# Patient Record
Sex: FEMALE | Race: WHITE | NOT HISPANIC OR LATINO | Employment: OTHER | URBAN - METROPOLITAN AREA
[De-identification: names, ages, dates, MRNs, and addresses within clinical notes are randomized per-mention and may not be internally consistent; named-entity substitution may affect disease eponyms.]

---

## 2017-01-18 ENCOUNTER — LAB VISIT (OUTPATIENT)
Dept: LAB | Facility: HOSPITAL | Age: 22
End: 2017-01-18
Attending: OBSTETRICS & GYNECOLOGY
Payer: MEDICAID

## 2017-01-18 ENCOUNTER — INITIAL PRENATAL (OUTPATIENT)
Dept: OBSTETRICS AND GYNECOLOGY | Facility: CLINIC | Age: 22
End: 2017-01-18
Payer: MEDICAID

## 2017-01-18 ENCOUNTER — TELEPHONE (OUTPATIENT)
Dept: OBSTETRICS AND GYNECOLOGY | Facility: CLINIC | Age: 22
End: 2017-01-18

## 2017-01-18 VITALS
DIASTOLIC BLOOD PRESSURE: 66 MMHG | WEIGHT: 97.25 LBS | SYSTOLIC BLOOD PRESSURE: 100 MMHG | HEIGHT: 63 IN | BODY MASS INDEX: 17.23 KG/M2

## 2017-01-18 DIAGNOSIS — N91.2 AMENORRHEA: Primary | ICD-10-CM

## 2017-01-18 DIAGNOSIS — N91.2 AMENORRHEA: ICD-10-CM

## 2017-01-18 LAB
ABO + RH BLD: NORMAL
BLD GP AB SCN CELLS X3 SERPL QL: NORMAL
HCG INTACT+B SERPL-ACNC: NORMAL MIU/ML

## 2017-01-18 PROCEDURE — 86901 BLOOD TYPING SEROLOGIC RH(D): CPT

## 2017-01-18 PROCEDURE — 36415 COLL VENOUS BLD VENIPUNCTURE: CPT

## 2017-01-18 PROCEDURE — 86900 BLOOD TYPING SEROLOGIC ABO: CPT

## 2017-01-18 PROCEDURE — 84702 CHORIONIC GONADOTROPIN TEST: CPT

## 2017-01-18 PROCEDURE — 99204 OFFICE O/P NEW MOD 45 MIN: CPT | Mod: S$PBB,,, | Performed by: OBSTETRICS & GYNECOLOGY

## 2017-01-18 PROCEDURE — 99999 PR PBB SHADOW E&M-EST. PATIENT-LVL II: CPT | Mod: PBBFAC,,, | Performed by: OBSTETRICS & GYNECOLOGY

## 2017-01-18 PROCEDURE — 99212 OFFICE O/P EST SF 10 MIN: CPT | Mod: PBBFAC,PO,25 | Performed by: OBSTETRICS & GYNECOLOGY

## 2017-01-18 NOTE — PROGRESS NOTES
"21 y.o.   OB History      Para Term  AB TAB SAB Ectopic Multiple Living    1                 Comlaining of:  Amenorrhea, pt has hx of irreg cycles, last one October, not unusaul to skip  Has symptoms, no spotting,   G1 pos home test x 2    PMH neg  PSH neg   allergies sulfa  Sh neg  Fh neg      ROS:  GENERAL: No fever, chills, fatigability or weight loss.  SKIN: No rashes, itching or changes in color or texture of skin.  HEAD: No headaches or recent head trauma.  EYES: Visual acuity fine. No photophobia, ocular pain or diplopia.  EARS: Denies ear pain, discharge or vertigo.  NOSE: No loss of smell, no epistaxis or postnasal drip.  MOUTH & THROAT: No hoarseness or change in voice. No excessive gum bleeding.  NODES: Denies swollen glands.  CHEST: Denies GOETZ, cyanosis, wheezing, cough and sputum production.  CARDIOVASCULAR: Denies chest pain, PND, orthopnea or reduced exercise tolerance.  ABDOMEN: Appetite fine. No weight loss. Denies diarrhea, abdominal pain, hematemesis or blood in stool.  URINARY: No flank pain, dysuria or hematuria.  PERIPHERAL VASCULAR: No claudication or cyanosis.  MUSCULOSKELETAL: No joint stiffness or swelling. Denies back pain.  NEUROLOGIC: No history of seizures, paralysis, alteration of gait or coordination      PE:   Visit Vitals    /66    Ht 5' 3" (1.6 m)    Wt 44.1 kg (97 lb 3.9 oz)    LMP  (LMP Unknown)    BMI 17.23 kg/m2    head/neck normal, thyroid normal  Breasts , fibroicystic feeling on both sides, more on L, started since felt pregnant  abds soft, nontender, no organomegaly  extr normal, no edema    ass pos home test x 2  Plan, bedside us done, too early, no iup seen  Plan, send for hcg level  counselled on levels and expectations to see gest sac  Will call with results to pt        "

## 2017-01-19 NOTE — TELEPHONE ENCOUNTER
----- Message from Lauren Dudley sent at 1/19/2017  2:09 PM CST -----  Contact: [phone]/SELF  Pt would like to speak with the nurse about test results.  Please advise

## 2017-01-19 NOTE — TELEPHONE ENCOUNTER
Informed patient of results. Patient voiced understanding. Notified patient to call office if there were any further questions.   Patient scheduled for ultrasound on 1-23-17

## 2017-01-23 ENCOUNTER — OFFICE VISIT (OUTPATIENT)
Dept: OBSTETRICS AND GYNECOLOGY | Facility: CLINIC | Age: 22
End: 2017-01-23
Payer: MEDICAID

## 2017-01-23 ENCOUNTER — TELEPHONE (OUTPATIENT)
Dept: OBSTETRICS AND GYNECOLOGY | Facility: CLINIC | Age: 22
End: 2017-01-23

## 2017-01-23 DIAGNOSIS — N91.2 AMENORRHEA: ICD-10-CM

## 2017-01-23 DIAGNOSIS — Z36.89 ENCOUNTER TO ESTABLISH GESTATIONAL AGE USING ULTRASOUND: Primary | ICD-10-CM

## 2017-01-23 PROCEDURE — 76817 TRANSVAGINAL US OBSTETRIC: CPT | Mod: 26,S$PBB,, | Performed by: OBSTETRICS & GYNECOLOGY

## 2017-01-23 PROCEDURE — 76817 TRANSVAGINAL US OBSTETRIC: CPT | Mod: PBBFAC,PO

## 2017-01-23 NOTE — TELEPHONE ENCOUNTER
----- Message from Michael A. Wiedemann, MD sent at 1/23/2017 12:26 PM CST -----  us Lissette today fine, due date sept 18,  See me 2 weeks, yay

## 2017-01-23 NOTE — TELEPHONE ENCOUNTER
Informed patient of results. Patient voiced understanding. Notified patient to call office if there were any further questions. Patient to call and schedule two week follow up.

## 2017-01-30 ENCOUNTER — TELEPHONE (OUTPATIENT)
Dept: OBSTETRICS AND GYNECOLOGY | Facility: CLINIC | Age: 22
End: 2017-01-30

## 2017-01-30 ENCOUNTER — OFFICE VISIT (OUTPATIENT)
Dept: OBSTETRICS AND GYNECOLOGY | Facility: CLINIC | Age: 22
End: 2017-01-30
Payer: MEDICAID

## 2017-01-30 DIAGNOSIS — N89.8 VAGINAL DISCHARGE DURING PREGNANCY, UNSPECIFIED TRIMESTER: Primary | ICD-10-CM

## 2017-01-30 DIAGNOSIS — O26.899 VAGINAL DISCHARGE DURING PREGNANCY, UNSPECIFIED TRIMESTER: Primary | ICD-10-CM

## 2017-01-30 DIAGNOSIS — O26.899 VAGINAL DISCHARGE DURING PREGNANCY, UNSPECIFIED TRIMESTER: ICD-10-CM

## 2017-01-30 DIAGNOSIS — N89.8 VAGINAL DISCHARGE DURING PREGNANCY, UNSPECIFIED TRIMESTER: ICD-10-CM

## 2017-01-30 PROCEDURE — 76801 OB US < 14 WKS SINGLE FETUS: CPT | Mod: PBBFAC,PO

## 2017-01-30 PROCEDURE — 76801 OB US < 14 WKS SINGLE FETUS: CPT | Mod: 26,S$PBB,, | Performed by: OBSTETRICS & GYNECOLOGY

## 2017-01-30 NOTE — TELEPHONE ENCOUNTER
Returned patients call.   Patient notified to keep appointment as scheduled. Verbalized understanding.

## 2017-01-30 NOTE — TELEPHONE ENCOUNTER
"Returned patients call.   Patient is calling reporting clear vaginal discharge that is "coming out of her." Patient is negative for vaginal bleeding, vaginal irritation, abdominal pain and cramps. Verbalized understanding. Per Dr. Wiedemann, patient was given same day ultrasound for evaluation. Patient verbalized understanding.   "

## 2017-01-30 NOTE — TELEPHONE ENCOUNTER
----- Message from Maral Fernández sent at 1/30/2017  8:38 AM CST -----  Contact: self/[phone]  Patient says that she is having like a clear watery discharge coming out that is scaring her.  Please advise

## 2017-01-30 NOTE — TELEPHONE ENCOUNTER
----- Message from Erica Patel sent at 1/30/2017 11:09 AM CST -----  Contact: [phone]  Patient is requesting to come in before 12  To see the dr. Today, she has a 2pm and an ultrasound at 4pm

## 2017-02-06 ENCOUNTER — ROUTINE PRENATAL (OUTPATIENT)
Dept: OBSTETRICS AND GYNECOLOGY | Facility: CLINIC | Age: 22
End: 2017-02-06
Payer: MEDICAID

## 2017-02-06 VITALS — WEIGHT: 98.31 LBS | BODY MASS INDEX: 17.42 KG/M2

## 2017-02-06 DIAGNOSIS — Z3A.08 8 WEEKS GESTATION OF PREGNANCY: Primary | ICD-10-CM

## 2017-02-06 PROCEDURE — 99211 OFF/OP EST MAY X REQ PHY/QHP: CPT | Mod: PBBFAC,PO | Performed by: OBSTETRICS & GYNECOLOGY

## 2017-02-06 PROCEDURE — 99999 PR PBB SHADOW E&M-EST. PATIENT-LVL I: CPT | Mod: PBBFAC,,, | Performed by: OBSTETRICS & GYNECOLOGY

## 2017-02-06 PROCEDURE — 99213 OFFICE O/P EST LOW 20 MIN: CPT | Mod: TH,S$PBB,, | Performed by: OBSTETRICS & GYNECOLOGY

## 2017-02-06 RX ORDER — ONDANSETRON 4 MG/1
4 TABLET, FILM COATED ORAL DAILY PRN
Qty: 30 TABLET | Refills: 1 | Status: SHIPPED | OUTPATIENT
Start: 2017-02-06 | End: 2017-04-13

## 2017-03-16 ENCOUNTER — ROUTINE PRENATAL (OUTPATIENT)
Dept: OBSTETRICS AND GYNECOLOGY | Facility: CLINIC | Age: 22
End: 2017-03-16
Payer: MEDICAID

## 2017-03-16 ENCOUNTER — TELEPHONE (OUTPATIENT)
Dept: OBSTETRICS AND GYNECOLOGY | Facility: CLINIC | Age: 22
End: 2017-03-16

## 2017-03-16 VITALS — SYSTOLIC BLOOD PRESSURE: 88 MMHG | DIASTOLIC BLOOD PRESSURE: 60 MMHG | BODY MASS INDEX: 17.3 KG/M2 | WEIGHT: 97.69 LBS

## 2017-03-16 DIAGNOSIS — O20.0 THREATENED ABORTION: Primary | ICD-10-CM

## 2017-03-16 PROCEDURE — 99999 PR PBB SHADOW E&M-EST. PATIENT-LVL I: CPT | Mod: PBBFAC,,, | Performed by: OBSTETRICS & GYNECOLOGY

## 2017-03-16 PROCEDURE — 99211 OFF/OP EST MAY X REQ PHY/QHP: CPT | Mod: PBBFAC,PO | Performed by: OBSTETRICS & GYNECOLOGY

## 2017-03-16 PROCEDURE — 99213 OFFICE O/P EST LOW 20 MIN: CPT | Mod: TH,S$PBB,, | Performed by: OBSTETRICS & GYNECOLOGY

## 2017-03-16 NOTE — TELEPHONE ENCOUNTER
Pt says zofran is not working at all. Also, she says she has started to spot. Pt says last night she started to bleed a little bit heavier than a spot. Pt requests to come in tot he office today. Pt will come at 1

## 2017-03-16 NOTE — PROGRESS NOTES
Pt came for pelvic pain and spotting, fht good, abd soft, cx ltc,no blood or fluid on glove  Us done, viable, good fluid,   Pt with hx of PID, so posss scare tissue pulling,  No bladder symtooms,

## 2017-03-16 NOTE — TELEPHONE ENCOUNTER
----- Message from Barbara Jurado sent at 3/16/2017 11:03 AM CDT -----  Contact: Self/[phone]  Patient is experiencing nausea and spotting, She would like to be seen today if possible and also schedule an ultrasound. Please advise

## 2017-03-18 ENCOUNTER — NURSE TRIAGE (OUTPATIENT)
Dept: ADMINISTRATIVE | Facility: CLINIC | Age: 22
End: 2017-03-18

## 2017-03-18 NOTE — TELEPHONE ENCOUNTER
Reason for Disposition   Back pain during pregnancy   [1] SEVERE back pain (e.g., excruciating, unable to do any normal activities) AND [2] not improved 2 hours after pain medicine    Protocols used: ST BACK PAIN-A-AH, ST PREGNANCY - BACK PAIN-A-    Pt calling in regards to severe back pain even with taking pain medication prescribed by OB MD.  Advised pt to take one 325mg tab of Tylenol along with Oxycodone for pain management.  If the pain is not relieved go to ED for further assistance.

## 2017-03-18 NOTE — TELEPHONE ENCOUNTER
Reason for Disposition   Message left on identified answering machine.    Protocols used: ST NO CONTACT OR DUPLICATE CONTACT CALL-A-  LM x 2 on  at 1151am

## 2017-03-20 ENCOUNTER — TELEPHONE (OUTPATIENT)
Dept: OBSTETRICS AND GYNECOLOGY | Facility: CLINIC | Age: 22
End: 2017-03-20

## 2017-03-20 ENCOUNTER — HOSPITAL ENCOUNTER (EMERGENCY)
Facility: HOSPITAL | Age: 22
Discharge: HOME | End: 2017-03-20
Attending: EMERGENCY MEDICINE
Payer: MEDICAID

## 2017-03-20 VITALS
SYSTOLIC BLOOD PRESSURE: 111 MMHG | OXYGEN SATURATION: 100 % | DIASTOLIC BLOOD PRESSURE: 71 MMHG | TEMPERATURE: 98 F | BODY MASS INDEX: 17.36 KG/M2 | WEIGHT: 98 LBS | RESPIRATION RATE: 18 BRPM | HEART RATE: 66 BPM | HEIGHT: 63 IN

## 2017-03-20 DIAGNOSIS — O26.892 VAGINAL DISCHARGE DURING PREGNANCY, SECOND TRIMESTER: ICD-10-CM

## 2017-03-20 DIAGNOSIS — N89.8 VAGINAL DISCHARGE DURING PREGNANCY, SECOND TRIMESTER: ICD-10-CM

## 2017-03-20 DIAGNOSIS — O26.899 PELVIC PAIN IN PREGNANCY: Primary | ICD-10-CM

## 2017-03-20 DIAGNOSIS — R10.2 PELVIC PAIN IN PREGNANCY: Primary | ICD-10-CM

## 2017-03-20 LAB
ALBUMIN SERPL BCP-MCNC: 3.7 G/DL
ALP SERPL-CCNC: 46 U/L
ALT SERPL W/O P-5'-P-CCNC: 8 U/L
ANION GAP SERPL CALC-SCNC: 8 MMOL/L
AST SERPL-CCNC: 12 U/L
B-HCG UR QL: POSITIVE
BACTERIA GENITAL QL WET PREP: ABNORMAL
BASOPHILS # BLD AUTO: 0.03 K/UL
BASOPHILS NFR BLD: 0.2 %
BILIRUB SERPL-MCNC: 0.3 MG/DL
BILIRUB UR QL STRIP: NEGATIVE
BUN SERPL-MCNC: 7 MG/DL
CALCIUM SERPL-MCNC: 9.8 MG/DL
CHLORIDE SERPL-SCNC: 107 MMOL/L
CLARITY UR: ABNORMAL
CLUE CELLS VAG QL WET PREP: ABNORMAL
CO2 SERPL-SCNC: 21 MMOL/L
COLOR UR: YELLOW
CREAT SERPL-MCNC: 0.6 MG/DL
CTP QC/QA: YES
CTP QC/QA: YES
DIFFERENTIAL METHOD: ABNORMAL
EOSINOPHIL # BLD AUTO: 0.1 K/UL
EOSINOPHIL NFR BLD: 1.1 %
ERYTHROCYTE [DISTWIDTH] IN BLOOD BY AUTOMATED COUNT: 13.3 %
EST. GFR  (AFRICAN AMERICAN): >60 ML/MIN/1.73 M^2
EST. GFR  (NON AFRICAN AMERICAN): >60 ML/MIN/1.73 M^2
FILAMENT FUNGI VAG WET PREP-#/AREA: ABNORMAL
FLUAV AG SPEC QL IA: NEGATIVE
FLUBV AG SPEC QL IA: NEGATIVE
GLUCOSE SERPL-MCNC: 83 MG/DL
GLUCOSE UR QL STRIP: NEGATIVE
HCT VFR BLD AUTO: 35.7 %
HGB BLD-MCNC: 12.3 G/DL
HGB UR QL STRIP: NEGATIVE
KETONES UR QL STRIP: NEGATIVE
LEUKOCYTE ESTERASE UR QL STRIP: NEGATIVE
LIPASE SERPL-CCNC: 18 U/L
LYMPHOCYTES # BLD AUTO: 2.2 K/UL
LYMPHOCYTES NFR BLD: 18.7 %
MCH RBC QN AUTO: 33.6 PG
MCHC RBC AUTO-ENTMCNC: 34.5 %
MCV RBC AUTO: 98 FL
MONOCYTES # BLD AUTO: 0.4 K/UL
MONOCYTES NFR BLD: 3.5 %
NEUTROPHILS # BLD AUTO: 9.2 K/UL
NEUTROPHILS NFR BLD: 76.2 %
NITRITE UR QL STRIP: NEGATIVE
PH UR STRIP: >8 [PH] (ref 5–8)
PLATELET # BLD AUTO: 203 K/UL
PMV BLD AUTO: 10.6 FL
POTASSIUM SERPL-SCNC: 3.8 MMOL/L
PROT SERPL-MCNC: 6.7 G/DL
PROT UR QL STRIP: NEGATIVE
RBC # BLD AUTO: 3.66 M/UL
SODIUM SERPL-SCNC: 136 MMOL/L
SP GR UR STRIP: 1.02 (ref 1–1.03)
SPECIMEN SOURCE: ABNORMAL
SPECIMEN SOURCE: NORMAL
T VAGINALIS GENITAL QL WET PREP: ABNORMAL
URN SPEC COLLECT METH UR: ABNORMAL
UROBILINOGEN UR STRIP-ACNC: NEGATIVE EU/DL
WBC # BLD AUTO: 12.01 K/UL
WBC #/AREA VAG WET PREP: ABNORMAL
YEAST GENITAL QL WET PREP: ABNORMAL

## 2017-03-20 PROCEDURE — 87591 N.GONORRHOEAE DNA AMP PROB: CPT

## 2017-03-20 PROCEDURE — 87400 INFLUENZA A/B EACH AG IA: CPT | Mod: 59

## 2017-03-20 PROCEDURE — 63600175 PHARM REV CODE 636 W HCPCS: Performed by: PHYSICIAN ASSISTANT

## 2017-03-20 PROCEDURE — 25000003 PHARM REV CODE 250: Performed by: PHYSICIAN ASSISTANT

## 2017-03-20 PROCEDURE — 96361 HYDRATE IV INFUSION ADD-ON: CPT

## 2017-03-20 PROCEDURE — 81003 URINALYSIS AUTO W/O SCOPE: CPT

## 2017-03-20 PROCEDURE — 80053 COMPREHEN METABOLIC PANEL: CPT

## 2017-03-20 PROCEDURE — 85025 COMPLETE CBC W/AUTO DIFF WBC: CPT

## 2017-03-20 PROCEDURE — 99285 EMERGENCY DEPT VISIT HI MDM: CPT | Mod: 25

## 2017-03-20 PROCEDURE — 83690 ASSAY OF LIPASE: CPT

## 2017-03-20 PROCEDURE — 87210 SMEAR WET MOUNT SALINE/INK: CPT

## 2017-03-20 PROCEDURE — 96375 TX/PRO/DX INJ NEW DRUG ADDON: CPT

## 2017-03-20 PROCEDURE — 96365 THER/PROPH/DIAG IV INF INIT: CPT

## 2017-03-20 PROCEDURE — 81025 URINE PREGNANCY TEST: CPT

## 2017-03-20 RX ORDER — ACETAMINOPHEN 500 MG
1000 TABLET ORAL
Status: COMPLETED | OUTPATIENT
Start: 2017-03-20 | End: 2017-03-20

## 2017-03-20 RX ORDER — ONDANSETRON 2 MG/ML
4 INJECTION INTRAMUSCULAR; INTRAVENOUS
Status: COMPLETED | OUTPATIENT
Start: 2017-03-20 | End: 2017-03-20

## 2017-03-20 RX ORDER — CEFTRIAXONE 250 MG/1
250 INJECTION, POWDER, FOR SOLUTION INTRAMUSCULAR; INTRAVENOUS
Status: DISCONTINUED | OUTPATIENT
Start: 2017-03-20 | End: 2017-03-20

## 2017-03-20 RX ORDER — AZITHROMYCIN 250 MG/1
250 TABLET, FILM COATED ORAL 2 TIMES DAILY
Qty: 14 TABLET | Refills: 0 | Status: SHIPPED | OUTPATIENT
Start: 2017-03-20 | End: 2017-03-27

## 2017-03-20 RX ORDER — OXYCODONE AND ACETAMINOPHEN TABLETS 10; 300 MG/1; MG/1
1 TABLET ORAL EVERY 4 HOURS PRN
COMMUNITY
End: 2017-04-13

## 2017-03-20 RX ORDER — SODIUM CHLORIDE 9 MG/ML
1000 INJECTION, SOLUTION INTRAVENOUS
Status: COMPLETED | OUTPATIENT
Start: 2017-03-20 | End: 2017-03-20

## 2017-03-20 RX ORDER — AZITHROMYCIN 250 MG/1
1000 TABLET, FILM COATED ORAL
Status: COMPLETED | OUTPATIENT
Start: 2017-03-20 | End: 2017-03-20

## 2017-03-20 RX ADMIN — ACETAMINOPHEN 1000 MG: 500 TABLET ORAL at 04:03

## 2017-03-20 RX ADMIN — ONDANSETRON 4 MG: 2 INJECTION INTRAMUSCULAR; INTRAVENOUS at 03:03

## 2017-03-20 RX ADMIN — SODIUM CHLORIDE 1000 ML: 0.9 INJECTION, SOLUTION INTRAVENOUS at 02:03

## 2017-03-20 RX ADMIN — AZITHROMYCIN 1000 MG: 250 TABLET, FILM COATED ORAL at 03:03

## 2017-03-20 RX ADMIN — CEFTRIAXONE 1 G: 1 INJECTION, SOLUTION INTRAVENOUS at 03:03

## 2017-03-20 NOTE — TELEPHONE ENCOUNTER
----- Message from Barbara Jurado sent at 3/20/2017  2:20 PM CDT -----  Contact: Self/[phone]  Patient said she has been in the ER for 2 hours and would like to speak with you. Please advise

## 2017-03-20 NOTE — ED AVS SNAPSHOT
[facility]  [address]  [place] [address]               Raya Kent   3/20/2017  1:20 PM   ED    Description:  Female : 1995   Department:  [facility]           Your Care was Coordinated By:     Provider Role From To    Janice Oliveira Do, MD Attending Provider 17 [phone] --    Shira Jordan PA-C Physician Assistant 17 [id number] --      Reason for Visit     Generalized Body Aches           Diagnoses this Visit        Comments    Pelvic pain in pregnancy    -  Primary       ED Disposition     None           To Do List           Follow-up Information     Follow up with Michael A Wiedemann, MD In 2 days.    Specialties:  Obstetrics, Obstetrics and Gynecology    Contact information:    [address] [place] [place] [address]  [phone]         These Medications        Disp Refills Start End    azithromycin (Z-BUCKY) 250 MG tablet 14 tablet 0 3/20/2017 3/27/2017    Take 1 tablet (250 mg total) by mouth 2 (two) times daily. - Oral    Pharmacy: [facility]/pharmacy #[id number] - [place] - [address] [facility] Ph #: [phone]         [facility]     [facility] Care [facility] -  Assistance  Registered nurses in the [facility] provide clinical advisement, health education, appointment booking, and other advisory services.  Call for this free service at [phone].             Medications           START taking these NEW medications        Refills    azithromycin (Z-BUCKY) 250 MG tablet 0    Sig: Take 1 tablet (250 mg total) by mouth 2 (two) times daily.    Class: Print    Route: Oral      These medications were administered today        Dose Freq    0.9%  NaCl infusion 1,000 mL ED 1 Time    Sig: Inject 1,000 mLs into the vein ED 1 Time.    Class: Normal    Route: Intravenous    Cosign for Ordering: Required by Janice Oliveira Do, MD    ondansetron injection 4 mg 4 mg ED 1 Time    Sig: Inject 4 mg into the vein ED 1  "Time.    Class: Normal    Route: Intravenous    Cosign for Ordering: Required by Janice Oliveira Do, MD    azithromycin tablet 1,000 mg 1,000 mg ED 1 Time    Sig: Take 4 tablets (1,000 mg total) by mouth ED 1 Time.    Class: Normal    Route: Oral    Cosign for Ordering: Required by Janice Oliveira Do, MD    cefTRIAXone (ROCEPHIN) 1 g in dextrose 5 % 50 mL IVPB 1 g ED 1 Time    Sig: Inject 50 mLs (1 g total) into the vein ED 1 Time.    Class: Normal    Route: Intravenous    Cosign for Ordering: Required by Janice Oliveira Do, MD    acetaminophen tablet 1,000 mg 1,000 mg ED 1 Time    Sig: Take 2 tablets (1,000 mg total) by mouth ED 1 Time.    Class: Normal    Route: Oral    Cosign for Ordering: Required by Janice Oliveira Do, MD           Verify that the below list of medications is an accurate representation of the medications you are currently taking.  If none reported, the list may be blank. If incorrect, please contact your healthcare provider. Carry this list with you in case of emergency.           Current Medications     oxycodone-acetaminophen (LYNOX)  mg per tablet Take 1 tablet by mouth every 4 (four) hours as needed for Pain.    azithromycin (Z-BUCKY) 250 MG tablet Take 1 tablet (250 mg total) by mouth 2 (two) times daily.    ondansetron (ZOFRAN, AS HYDROCHLORIDE,) 4 MG tablet Take 1 tablet (4 mg total) by mouth daily as needed for Nausea.           Clinical Reference Information           Your Vitals Were     BP Pulse Temp Resp Height Weight    111/71 (BP Location: Right arm, Patient Position: Standing, BP Method: Automatic) 66 98.3 °F (36.8 °C) (Oral) 18 5' 3" (1.6 m) 44.5 kg (98 lb)    Last Period SpO2 BMI          (LMP Unknown) 100% 17.36 kg/m2        Allergies as of 3/20/2017        Reactions    Sulfa (Sulfonamide Antibiotics) Hives      Immunizations Administered on Date of Encounter - 3/20/2017     None      ED Micro, Lab, POCT     Start Ordered       Status Ordering Provider    03/20/17 1414 " 03/20/17 1414  UPT (Pregnancy, urine rapid)  STAT      In process     03/20/17 1414 03/20/17 1414  C. trachomatis/N. gonorrhoeae by AMP DNA Urine  STAT      In process     03/20/17 1414 03/20/17 1414  Vaginal Screen Vagina  STAT      Final result     03/20/17 1414 03/20/17 1414  Influenza antigen Nasopharyngeal Swab  STAT      Final result     03/20/17 1414 03/20/17 1414  C. trachomatis/N. gonorrhoeae by AMP DNA  Once      In process     03/20/17 1413 03/20/17 1414  CBC W/ AUTO DIFFERENTIAL  Once      Final result     03/20/17 1413 03/20/17 1414  Comp. Metabolic Panel  STAT      Final result     03/20/17 1413 03/20/17 1414  Lipase  STAT      Final result     03/20/17 1413 03/20/17 1414  Urinalysis - Clean Catch  STAT      Final result     03/20/17 0000 03/20/17 1428  POCT urine pregnancy     Comments:  This order was created through External Result Entry    Completed     03/20/17 0000 03/20/17 1552  POCT urine pregnancy     Comments:  This order was created through External Result Entry    Completed       ED Imaging Orders     None        Discharge Instructions         Abdominal Pain and Early Pregnancy    (To rule out ectopic pregnancy or miscarriage)  Our tests show that you are pregnant, but the exact cause of your pain isnt clear.  Some pain and bleeding are common early in pregnancy. Often they stop, and you can go on to have a normal pregnancy and baby. Other times the pain or bleeding can be signs of a miscarriage or ectopic pregnancy. An ectopic pregnancy is a very serious problem. At this time it is unclear if your pregnancy will continue normally, if you will have a miscarriage, or if you could have an ectopic pregnancy. Below is some information about this.  Miscarriage  At this time we dont know whether you will have a miscarriage, or if things will clear up and your pregnancy will continue normally. We understand that this is emotionally difficult. There is little we can say to change the way you  feel. But understand that miscarriages are common.  About 1 or 2 out of every 10 pregnancies end this way. Some end even before you know you are pregnant. This happens for a number of reasons, and usually we never figure out why. Its important you know that it is not your fault. It didnt happen because you did anything wrong.  Having sex or exercising does not cause a miscarriage. These activities are usually safe unless you have pain or bleeding or your doctor tells you to stop. Even minor falls wont cause a miscarriage. Miscarriages happen because things were not developing as they were supposed to. No medicine can prevent a miscarriage.  Ectopic pregnancy  In a normal pregnancy, the fertilized egg attaches to the wall of the womb (uterus). In an ectopic or tubal pregnancy, the fertilized egg attaches outside the uterus, usually in the fallopian tube. Very rarely, the egg attaches to an ovary or somewhere else in the abdomen. An ectopic pregnancy is much less common than a miscarriage, but it is very serious. The baby cannot survive, and as it grows it can rupture the tube. This can cause internal bleeding and even death. Risk factors for an ectopic are:  · An ectopic pregnancy in the past  · Pelvic inflammatory disease, or PID  · Endometriosis  · Smoking  · An IUD  Additional tests  Because we dont know whats causing your symptoms, you will need more tests to figure out what the problem is. You may need:  Ultrasound  An ultrasound can usually find a normal pregnancy as early as 4 to 5 weeks along. If the ultrasound does not show the baby inside the uterus, it means that:  · You have a normal pregnancy less than 4 weeks along, or  · You are having or recently had a miscarriage, or  · You have an ectopic pregnancy  Quantitative HCG  This test measures the amount of a pregnancy hormone in your blood. Comparing today's test result to a repeat test in 2 days will show whether you have a normal  pregnancy.  Laparoscopy  This is a type of surgery. The healthcare provider will put a tube with a light inside your belly (abdomen) to look directly at your pelvic organs. This test is used when it is not safe to wait 2 days for blood test results.  Important information  If you do have an ectopic pregnancy, there is a small chance that the growing fetus can tear the fallopian tube. This can cause severe internal bleeding. If this happens, you may have:  · Sudden severe pain in your lower abdomen  · Vaginal bleeding  · Weakness, dizziness, and sometimes fainting  If any of these symptoms occur:  · Call 911 or return immediately to the hospital.  · Do not drive yourself.  · Do not go to your healthcare provider's office or to a clinic. Go to the hospital.   Home care  Follow these guidelines to help care for yourself at home:  · Rest until your next exam. Dont do anything strenuous.  · Eat a light diet with foods that are easy to digest.  · Dont have sex until your healthcare provider says its OK.  Follow-up care  Follow up with your healthcare provider, or as advised. If you were told to have a repeat blood test in 2 days, its important to get it done.  If you had an X-ray or ultrasound, a radiologist will review it. You will be told of any new findings that may affect your care.  Call 911  Call 911 if you have any of these:  · Severe pain and very heavy bleeding  · Severe lightheadedness, passing out, or fainting  · Rapid heart rate  · Trouble breathing  · Confused or difficulty waking up  When to seek medical advice  Call your healthcare provider right away if any of these occur:  · The pain in your abdomen gets worse, either suddenly or gradually.  · You are dizzy or weak when you stand.  · You have heavy vaginal bleeding. This means soaking 1 pad an hour for 3 hours.  · You have vaginal bleeding for more than 5 days.  · You have repeated vomiting or diarrhea.  · The pain in your abdomen moves to the lower  right.  · You have blood in your vomit or bowel movements. This will be dark red or black.  · You have a fever of 100.4ºF (38ºC) or higher, or as directed by your healthcare provider  Date Last Reviewed: 10/1/2016  © 5016-7737 [employer]. [address] [place] [address]. All rights reserved. This information is not intended as a substitute for professional medical care. Always follow your healthcare professional's instructions.          Your Scheduled Appointments     Mar 23, 2017 10:00 AM CDT   Routine Prenatal Visit with Michael A. Wiedemann, MD [facility] ([place])    [address] [place] [address]  5th Floor [facility], [address]  [facility] [address] [phone]              [facility] Sign-Up     Activating your [facility] account is as easy as 1-2-3!     1) Visit [email or web address], select Sign Up Now, enter this activation code and your date of birth, then select Next.  [email or web address]  Expires: 5/4/2017  4:50 PM      2) Create a username and password to use when you visit [facility] in the future and select a security question in case you lose your password and select Next.    3) Enter your e-mail address and click Sign Up!    Additional Information  If you have questions, please e-mail [email or web address] or call [phone] to talk to our [facility] staff. Remember, [facility] is NOT to be used for urgent needs. For medical emergencies, dial 911.         Smoking Cessation     If you would like to quit smoking:   You may be eligible for free services if you are a [place] resident and started smoking cigarettes before September 1, 1988.  Call the Smoking Cessation Trust (SCT) toll free at (989) 621-7223 or (761) 503-1792.   Call 8-800-QUIT-NOW if you do not meet the above criteria.             [facility] complies with applicable Federal civil rights laws and does not discriminate on the basis of race, color, national origin, age, disability, or sex.        Language  Assistance Services     ATTENTION: Language assistance services are available, free of charge. Please call [phone].      ATENCIÓN: Si habla español, tiene a patiño disposición servicios gratuitos de asistencia lingüística. Llame al [phone].     CHÚ Ý: N?u b?n nói Ti?ng Vi?t, có các d?ch v? h? tr? ngôn ng? mi?n phí dành cho b?n. G?i s? [phone].

## 2017-03-20 NOTE — TELEPHONE ENCOUNTER
----- Message from Anurag Michaud sent at 3/20/2017 10:39 AM CDT -----  Contact: self/[phone]  She has severe pain in back and pelvis; she 4 months and 5 days; she is requesting an appointment today.

## 2017-03-20 NOTE — TELEPHONE ENCOUNTER
"Returned patients call.   Patient was advised at her last visit to go to the ER for her pain and the patient states that she did not due to not wanting her "old man" to carry her due to her not being able to walk due to the pain. Patient states she has pain medication that was given to her on 03/16/17 that she has been taking for her pain but states the medication is now out and she is in severe pain. Patient was notified that since the pain has become unmanageable by medication it is advised she should go to the ER for further treatment and testing. Patient verbalized understanding.   "

## 2017-03-20 NOTE — ED NOTES
Pt c/o generalized body aches since she has 8 weeks pregnant.  Pt states she is prescribed percocet per OBGYN.  Pt states she is approx 15 weeks pregnant.  Pt denies vaginal bleeding.  Pt c/o body aches including abd.

## 2017-03-20 NOTE — ED PROVIDER NOTES
Encounter Date: 3/20/2017       History     Chief Complaint   Patient presents with    Generalized Body Aches     x3 months; states is 15 weeks pregnant; patient of Dr. Marin; states has been on percocet and has been having no relief; denies vaginal bleeding;      Review of patient's allergies indicates:   Allergen Reactions    Sulfa (sulfonamide antibiotics) Hives     HPI Comments: Raya Kent, a 21 y.o. Female  that presents to the ED for generalized body pain for the last 3 months.  She is 15 weeks pregnant and is being followed by Dr. Lester who has prescribed percocet.  This medication is not helping her generalized body aches.  In addition she has not had a good bowel movement in over a week and is suffering with nausea. She denies any vaginal bleeding, vaginal discharge.  She does have a PMH of being infected with chlamydia on three separate occasions and PID for which she received treatment.        The history is provided by the patient.     Past Medical History:   Diagnosis Date    Depression     H/O psychiatric hospitalization      Past Surgical History:   Procedure Laterality Date    TYMPANOPLASTY       Family History   Problem Relation Age of Onset    Hypertension Father     Diabetes Father     Hypertension Mother     Diabetes Mother      Social History   Substance Use Topics    Smoking status: Former Smoker     Types: Cigarettes    Smokeless tobacco: Never Used    Alcohol use None     Review of Systems   Constitutional: Negative for fever.   HENT: Negative for trouble swallowing.    Respiratory: Negative for cough and shortness of breath.    Gastrointestinal: Positive for abdominal pain, constipation and nausea. Negative for diarrhea and vomiting.   Genitourinary: Positive for pelvic pain. Negative for dysuria, vaginal bleeding and vaginal discharge.   Skin: Negative for color change and rash.   Neurological: Negative for speech difficulty and numbness.   Hematological: Does not  bruise/bleed easily.   Psychiatric/Behavioral: Negative for agitation and confusion.   All other systems reviewed and are negative.      Physical Exam   Initial Vitals   BP Pulse Resp Temp SpO2   03/20/17 1304 03/20/17 1304 03/20/17 1304 03/20/17 1304 03/20/17 1304   112/53 90 14 98.3 °F (36.8 °C) 100 %     Physical Exam    Nursing note and vitals reviewed.  Constitutional: She appears well-developed and well-nourished. No distress.   HENT:   Head: Normocephalic and atraumatic.   Right Ear: External ear normal.   Left Ear: External ear normal.   Nose: Nose normal.   Eyes: Conjunctivae and EOM are normal.   Neck: Normal range of motion. No tracheal deviation present.   Cardiovascular: Normal rate and regular rhythm.   Pulmonary/Chest: Breath sounds normal. No respiratory distress. She has no wheezes. She has no rhonchi. She has no rales.   Abdominal: Soft. Bowel sounds are normal. She exhibits no distension. There is tenderness in the right lower quadrant, suprapubic area and left lower quadrant. There is no rigidity, no rebound and no guarding.   Genitourinary: Vagina normal and uterus normal. Pelvic exam was performed with patient supine. Cervix exhibits motion tenderness and discharge (copious amounts of cloudy discharge ). No erythema or bleeding in the vagina.   Musculoskeletal: Normal range of motion.   Neurological: She is alert and oriented to person, place, and time. She has normal strength.   Skin: Skin is warm and dry.   Psychiatric: She has a normal mood and affect. Thought content normal.         ED Course   Procedures  Labs Reviewed   CBC W/ AUTO DIFFERENTIAL - Abnormal; Notable for the following:        Result Value    RBC 3.66 (*)     Hematocrit 35.7 (*)     MCH 33.6 (*)     Gran # 9.2 (*)     Gran% 76.2 (*)     Mono% 3.5 (*)     All other components within normal limits   COMPREHENSIVE METABOLIC PANEL - Abnormal; Notable for the following:     CO2 21 (*)     Alkaline Phosphatase 46 (*)     ALT 8 (*)      All other components within normal limits   URINALYSIS - Abnormal; Notable for the following:     Appearance, UA Hazy (*)     pH, UA >8.0 (*)     All other components within normal limits   VAGINAL SCREEN - Abnormal; Notable for the following:     WBC - Vaginal Screen Moderate (*)     Bacteria - Vaginal Screen Many (*)     All other components within normal limits   C. TRACHOMATIS/N. GONORRHOEAE BY AMP DNA   C. TRACHOMATIS/N. GONORRHOEAE BY AMP DNA   LIPASE   INFLUENZA A AND B ANTIGEN   PREGNANCY TEST, URINE RAPID             Medical Decision Making:   Initial Assessment:   Generalized body aches, abdominal pain in pregnancy   Differential Diagnosis:   STD, UTI, BV, Trich, PID  Clinical Tests:   Lab Tests: Ordered and Reviewed  The following lab test(s) were unremarkable: CBC, CMP, Urinalysis and Lipase  ED Management:  Fluids and zofran given in the ED.  Tylenol was given with little improvement of pain, but declining to give narcotics d/t pregnancy.  Consulted with Dr. Wiedemann who instructed to give the patient 1 week of azithromycin and to have her f/u in clinic this week for further evaluation.  No further testing requested by Dr. Juan to be done in the ED at this time. She was instructed to refrain from any sexual activity until she is cleared by her OB/GYN and to take tylenol as needed for pain.  Strict return precautions given and patient verbalized understanding.    RX: azithromycin                 Attending Attestation:     Physician Attestation Statement for NP/PA:   I discussed this assessment and plan of this patient with the NP/PA, but I did not personally examine the patient. The face to face encounter was performed by the NP/PA.    Other NP/PA Attestation Additions:    History of Present Illness: Pt with vaginal discharge and generalized pain all over since getting pregnant.  Vitals stable    Physical Exam: Thick vaginal discharge on exam, otherwise normal exam   Medical Decision Making:  Chlamydia and gonorrhea treated.  Dr. Juan consulted and asked that pt be started in azithromycin and he follow up cultures.                  ED Course     Clinical Impression:   The encounter diagnosis was Pelvic pain in pregnancy.          Shira Jordan PA-C  03/20/17 0983       Janice Oliveira Do, MD  03/20/17 2035

## 2017-03-20 NOTE — TELEPHONE ENCOUNTER
Pt states that she is downstairs in the ED and she would like Dr. Wiedemann to come see her. Pt advised that Dr. Wiedemann is on call and if they ED MD feels he needs to be consulted he will see her. Pt verbalized understanding. Pt scheduled for ob appt 3/23

## 2017-03-21 LAB
C TRACH DNA SPEC QL NAA+PROBE: NOT DETECTED
N GONORRHOEA DNA SPEC QL NAA+PROBE: NOT DETECTED

## 2017-03-23 ENCOUNTER — ROUTINE PRENATAL (OUTPATIENT)
Dept: OBSTETRICS AND GYNECOLOGY | Facility: CLINIC | Age: 22
End: 2017-03-23
Payer: MEDICAID

## 2017-03-23 VITALS — SYSTOLIC BLOOD PRESSURE: 98 MMHG | DIASTOLIC BLOOD PRESSURE: 70 MMHG | BODY MASS INDEX: 17.11 KG/M2 | WEIGHT: 96.56 LBS

## 2017-03-23 DIAGNOSIS — Z3A.14 14 WEEKS GESTATION OF PREGNANCY: Primary | ICD-10-CM

## 2017-03-23 PROCEDURE — 99213 OFFICE O/P EST LOW 20 MIN: CPT | Mod: TH,S$PBB,, | Performed by: OBSTETRICS & GYNECOLOGY

## 2017-03-23 PROCEDURE — 99212 OFFICE O/P EST SF 10 MIN: CPT | Mod: PBBFAC,PO | Performed by: OBSTETRICS & GYNECOLOGY

## 2017-03-23 PROCEDURE — 99999 PR PBB SHADOW E&M-EST. PATIENT-LVL II: CPT | Mod: PBBFAC,,, | Performed by: OBSTETRICS & GYNECOLOGY

## 2017-03-23 NOTE — PROGRESS NOTES
fht good, abd soft, looks and feels better, was in ed, std negative, still feel it ws from scr tissue, pt asking for narcotic painmeds, not given, told to use tyleneol on limited basis,

## 2017-03-23 NOTE — LETTER
March 23, 2017    Raya Kent  [address]  [place] [address]              [place] [facility]  [address], [address]  [address] [facility]  [place] [address]  Phone: [phone]    To Whom It May Concern:    Ms. Kent is currently under our care for pregnancy. She is able to work with no restrictions at this time. If you have any further questions please feel free to contact our office.   Estimated Date of Delivery: 09-        Sincerely,          Michael A. Wiedemann, M.D.

## 2017-04-10 ENCOUNTER — TELEPHONE (OUTPATIENT)
Dept: OBSTETRICS AND GYNECOLOGY | Facility: CLINIC | Age: 22
End: 2017-04-10

## 2017-04-10 NOTE — LETTER
April 10, 2017    Raya Kent  [address]  [place] [address]              [place] [facility]  [address]  [address]th [address] [facility]  [place] [address]  Phone: [phone]      To Whom It May Concern:    Ms. Kent is currently under our care for pregnancy.  Estimated Date of Delivery: 09-18-17    Any elective dental work should preferably be scheduled during or after the mid-trimester or postpartum.    Dental procedures can be performed with local anesthesia without epinephrine. Recommended antibiotics include penicillins, erythromycin, and cephalosporins. Tylenol #3 or Percocet may be used for pain control. For dental problems, up to four x-rays may be taken with proper abdominal shield.        Sincerely,        Michael A. Wiedemann, M.D.

## 2017-04-10 NOTE — TELEPHONE ENCOUNTER
----- Message from Roxanne Dickinson sent at 4/10/2017  9:09 AM CDT -----  Contact: Self [phone]  Patient is calling to talk to nurse concerning she is going to the dentist to have work done and she needs a doctors note that is okay for her to have work done since she is 17 weeks pregnant she will be going today. Please advice

## 2017-04-10 NOTE — TELEPHONE ENCOUNTER
Returned patients call.   Patient notified the note she requested for dental work is ready for pickup. Patient verbalized understanding.

## 2017-04-10 NOTE — TELEPHONE ENCOUNTER
----- Message from Maral Fernández sent at 4/10/2017  9:28 AM CDT -----  Contact: self/[phone]  Patient is returning your call and would like a call right back.  Please advise

## 2017-04-13 ENCOUNTER — LAB VISIT (OUTPATIENT)
Dept: LAB | Facility: HOSPITAL | Age: 22
End: 2017-04-13
Attending: OBSTETRICS & GYNECOLOGY
Payer: MEDICAID

## 2017-04-13 ENCOUNTER — ROUTINE PRENATAL (OUTPATIENT)
Dept: OBSTETRICS AND GYNECOLOGY | Facility: CLINIC | Age: 22
End: 2017-04-13
Payer: MEDICAID

## 2017-04-13 VITALS — BODY MASS INDEX: 18.16 KG/M2 | WEIGHT: 102.5 LBS | DIASTOLIC BLOOD PRESSURE: 70 MMHG | SYSTOLIC BLOOD PRESSURE: 102 MMHG

## 2017-04-13 DIAGNOSIS — Z36.3 ENCOUNTER FOR ROUTINE SCREENING FOR MALFORMATION USING ULTRASONICS: ICD-10-CM

## 2017-04-13 DIAGNOSIS — Z3A.17 17 WEEKS GESTATION OF PREGNANCY: Primary | ICD-10-CM

## 2017-04-13 DIAGNOSIS — Z3A.17 17 WEEKS GESTATION OF PREGNANCY: ICD-10-CM

## 2017-04-13 PROCEDURE — 87340 HEPATITIS B SURFACE AG IA: CPT

## 2017-04-13 PROCEDURE — 36415 COLL VENOUS BLD VENIPUNCTURE: CPT

## 2017-04-13 PROCEDURE — 81511 FTL CGEN ABNOR FOUR ANAL: CPT

## 2017-04-13 PROCEDURE — 99999 PR PBB SHADOW E&M-EST. PATIENT-LVL II: CPT | Mod: PBBFAC,,, | Performed by: OBSTETRICS & GYNECOLOGY

## 2017-04-13 PROCEDURE — 99213 OFFICE O/P EST LOW 20 MIN: CPT | Mod: TH,S$PBB,, | Performed by: OBSTETRICS & GYNECOLOGY

## 2017-04-13 PROCEDURE — 86762 RUBELLA ANTIBODY: CPT

## 2017-04-13 PROCEDURE — 99212 OFFICE O/P EST SF 10 MIN: CPT | Mod: PBBFAC,PO | Performed by: OBSTETRICS & GYNECOLOGY

## 2017-04-13 PROCEDURE — 86592 SYPHILIS TEST NON-TREP QUAL: CPT

## 2017-04-13 PROCEDURE — 86703 HIV-1/HIV-2 1 RESULT ANTBDY: CPT

## 2017-04-13 PROCEDURE — 81220 CFTR GENE COM VARIANTS: CPT

## 2017-04-14 LAB
HBV SURFACE AG SERPL QL IA: NEGATIVE
HIV 1+2 AB+HIV1 P24 AG SERPL QL IA: NEGATIVE
RPR SER QL: NORMAL

## 2017-04-17 ENCOUNTER — TELEPHONE (OUTPATIENT)
Dept: OBSTETRICS AND GYNECOLOGY | Facility: CLINIC | Age: 22
End: 2017-04-17

## 2017-04-17 LAB
ALPHA FETOPROTEIN MATERNAL: 125.9 NG/ML
CFTR MUT ANL BLD/T: NORMAL
DOWN RISK (<1:270): ABNORMAL
ETHNIC ORIGIN: ABNORMAL
GA METHOD: ABNORMAL
GESTATIONAL AGE (DAYS): 3
GESTATIONAL AGE (WEEKS): 17
HUMAN CHORIONIC GONADOTROPIN: 30.2 IU/ML
INHIBIN A: 390.7 PG/ML
INSULIN DEPEND. DIABETES: ABNORMAL
M.O.M. ALPHA FETOPROTEIN: 2.65
M.O.M. HCG: 0.89
M.O.M. INHIBIN A: 2.05
M.O.M. UNCONJ. ESTRIOL: 0.91
MATERNAL AGE AT EDD (YRS): 20
MATERNAL AGE FOR DOWN: ABNORMAL
MATERNAL WEIGHT (LBS): 102
MULTIPLE GESTATIONS: ABNORMAL
QUAD SCREEN INTERPRETATION: ABNORMAL
QUAD SCREEN: POSITIVE
RUBV IGG SER-ACNC: 14.2 IU/ML
RUBV IGG SER-IMP: REACTIVE
TRISOMY 18 (<1:100): ABNORMAL
UNCONJUGATED ESTRIOL: 1.05 NG/ML

## 2017-04-21 ENCOUNTER — TELEPHONE (OUTPATIENT)
Dept: OBSTETRICS AND GYNECOLOGY | Facility: CLINIC | Age: 22
End: 2017-04-21

## 2017-04-21 NOTE — TELEPHONE ENCOUNTER
Returned call to patients aunt. Aunt advised to sign an involvement of care form so the patients medical information may be discussed with her. Verbalized understanding.

## 2017-04-21 NOTE — TELEPHONE ENCOUNTER
----- Message from Ekta Mckinley sent at 4/21/2017  8:55 AM CDT -----  Contact: [phone]/Giovana pt's aunt   Pt's aunt its requesting to speak with the nurse in regarding pt's pregnancy , states she got a call from the pt crying stating she her pregnancy its on high risk and she doesn't understand why . Please advise

## 2017-04-24 ENCOUNTER — TELEPHONE (OUTPATIENT)
Dept: OBSTETRICS AND GYNECOLOGY | Facility: CLINIC | Age: 22
End: 2017-04-24

## 2017-04-25 ENCOUNTER — OFFICE VISIT (OUTPATIENT)
Dept: OBSTETRICS AND GYNECOLOGY | Facility: CLINIC | Age: 22
End: 2017-04-25
Payer: MEDICAID

## 2017-04-25 ENCOUNTER — ROUTINE PRENATAL (OUTPATIENT)
Dept: OBSTETRICS AND GYNECOLOGY | Facility: CLINIC | Age: 22
End: 2017-04-25
Payer: MEDICAID

## 2017-04-25 DIAGNOSIS — O28.0 ABNORMAL QUAD SCREEN: Primary | ICD-10-CM

## 2017-04-25 DIAGNOSIS — O28.0 ABNORMAL QUAD SCREEN: ICD-10-CM

## 2017-04-25 PROCEDURE — 99213 OFFICE O/P EST LOW 20 MIN: CPT | Mod: S$PBB,TH,25, | Performed by: OBSTETRICS & GYNECOLOGY

## 2017-04-25 PROCEDURE — 76805 OB US >/= 14 WKS SNGL FETUS: CPT | Mod: 26,S$PBB,, | Performed by: OBSTETRICS & GYNECOLOGY

## 2017-04-26 NOTE — PROGRESS NOTES
Pt brought in for us due to mat quad showing increased risk for neural tube defect. Us done, see report, normal at this time, discussed withpt and boyfried, will keep apt with mfm.

## 2017-04-28 ENCOUNTER — TELEPHONE (OUTPATIENT)
Dept: OBSTETRICS AND GYNECOLOGY | Facility: CLINIC | Age: 22
End: 2017-04-28

## 2017-04-28 NOTE — TELEPHONE ENCOUNTER
Spoke to patient and advised her that she will need to see M because of her abnormal quad. Pt was told that she has an appointment Wednesday at [facility] to see M. Pt states that she doesn't know if she will be able to get a ride to [facility]. Pt was advised that this is very important and we have no availability in [place] until the end of May. Pt verbalized understanding and said she will try to get a ride and call us back

## 2017-05-03 ENCOUNTER — TELEPHONE (OUTPATIENT)
Dept: OBSTETRICS AND GYNECOLOGY | Facility: CLINIC | Age: 22
End: 2017-05-03

## 2017-05-03 NOTE — TELEPHONE ENCOUNTER
----- Message from Abbi Nj sent at 5/3/2017 12:05 PM CDT -----  Contact: self/[phone]  Patient called to have a nurse call her back.  Please advise.

## 2017-05-03 NOTE — TELEPHONE ENCOUNTER
Pt called stating that she can't make it to the [facility] appointment. She wants to know if she still needs to see them. She only wants to be seen in [place] and there are no openings in [place] until the end of the month

## 2017-05-06 ENCOUNTER — HOSPITAL ENCOUNTER (OUTPATIENT)
Facility: HOSPITAL | Age: 22
Discharge: HOME | End: 2017-05-06
Attending: OBSTETRICS & GYNECOLOGY | Admitting: OBSTETRICS & GYNECOLOGY
Payer: MEDICAID

## 2017-05-06 VITALS
SYSTOLIC BLOOD PRESSURE: 102 MMHG | DIASTOLIC BLOOD PRESSURE: 55 MMHG | OXYGEN SATURATION: 99 % | RESPIRATION RATE: 18 BRPM | TEMPERATURE: 98 F | HEART RATE: 78 BPM

## 2017-05-06 DIAGNOSIS — R10.9 ABDOMINAL PAIN: ICD-10-CM

## 2017-05-06 PROCEDURE — 99211 OFF/OP EST MAY X REQ PHY/QHP: CPT

## 2017-05-06 PROCEDURE — 25000003 PHARM REV CODE 250: Performed by: OBSTETRICS & GYNECOLOGY

## 2017-05-06 RX ORDER — DOCUSATE SODIUM 100 MG/1
200 CAPSULE, LIQUID FILLED ORAL ONCE
Status: COMPLETED | OUTPATIENT
Start: 2017-05-06 | End: 2017-05-06

## 2017-05-06 RX ADMIN — DOCUSATE SODIUM 200 MG: 100 CAPSULE, LIQUID FILLED ORAL at 03:05

## 2017-05-06 NOTE — PLAN OF CARE
1435 Pt c/o abd pain x a few days, last bm several days ago. Pt dressed in gown & efm applied with +fetal heart tones & audible movement. Abd soft & non tender. Pt denies any n/v/d or vaginal bleeding or leaking of fluid. Head to toe assessment completed, wnl. Plan of care reviewed, call bell within reach. Friend at bedside.    1450 Dr Chaudhari at bedside.

## 2017-05-06 NOTE — DISCHARGE INSTRUCTIONS
Drink plenty of water. Get Magnesium Citrate from pharmacy and drink 1/2 of bottle. May repeat if no bowel movement. Stop taking iron until follow up with Dr Wiedemann. See Dr Wiedemann at next scheduled appointment. Return for any severe pain, decreased fetal movement, rupture of membranes or decreased fetal movement.

## 2017-05-06 NOTE — IP AVS SNAPSHOT
[facility]  [address]  [place] [address]  Phone: [phone]           Patient Discharge Instructions   Our goal is to set you up for success. This packet includes information on your condition, medications, and your home care.  It will help you care for yourself to prevent having to return to the hospital.     Please ask your nurse if you have any questions.      There are many details to remember when preparing to leave the hospital. Here is what you will need to do:    1. Take your medicine. If you are prescribed medications, review your Medication List on the following pages. You may have new medications to  at the pharmacy and others that you'll need to stop taking. Review the instructions for how and when to take your medications. Talk with your doctor or nurses if you are unsure of what to do.     2. Go to your follow-up appointments. Specific follow-up information is listed in the following pages. Your may be contacted by a nurse or clinical provider about future appointments. Be sure we have all of the phone numbers to reach you. Please contact your provider's office if you are unable to make an appointment.     3. Watch for warning signs. Your doctor or nurse will give you detailed warning signs to watch for and when to call for assistance. These instructions may also include educational information about your condition. If you experience any of warning signs to your health, call your doctor.           [facility]  Unless otherwise directed by your provider, please   contact [facility], our nurse care line   that is available for 24/7 assistance.     [phone] (toll-free)     Registered nurses in the [facility]   provide: appointment scheduling, clinical advisement, health education, and other advisory services.                  ** Verify the list of medication(s) below is accurate and up to date. Carry this with you in case of emergency. If your  medications have changed, please notify your healthcare provider.             Medication List      ASK your doctor about these medications        Additional Info                      NON FORMULARY MEDICATION   Refills:  0      Begin Date    AM    Noon    PM    Bedtime       trazodone 150 MG tablet   Commonly known as:  DESYREL   Refills:  0   Dose:  150 mg    Instructions:  Take 150 mg by mouth every evening.     Begin Date    AM    Noon    PM    Bedtime                  Please bring to all follow up appointments:    1. A copy of your discharge instructions.  2. All medicines you are currently taking in their original bottles.  3. Identification and insurance card.    Please arrive 15 minutes ahead of scheduled appointment time.    Please call 24 hours in advance if you must reschedule your appointment and/or time.        Your Scheduled Appointments     May 16, 2017 11:00 AM CDT   Routine Prenatal Visit with Michael A. Wiedemann, MD   [place] [facility] ([facility])    [address], [address]  [address]  [facility] [address] [phone]            May 31, 2017 10:30 AM CDT   Ultrasound with ULTRASOUND, [facility]   [place] ([facility])    [address]  [facility] [address]                     Discharge Instructions       Drink plenty of water. Get Magnesium Citrate from pharmacy and drink 1/2 of bottle. May repeat if no bowel movement. Stop taking iron until follow up with Dr Wiedemann. See Dr Wiedemann at next scheduled appointment. Return for any severe pain, decreased fetal movement, rupture of membranes or decreased fetal movement.      Admission Information     Date & Time Provider Department [facility]    5/6/2017  2:38 PM Michael A. Wiedemann, MD [facility] [id number]      Care Providers     Provider Role Specialty Primary office phone    Michael A. Wiedemann, MD Attending Provider Obstetrics [phone]      Your Vitals  Were     BP Pulse Temp Resp Last Period SpO2    102/55 78 98 °F (36.7 °C) 18 (LMP Unknown) 99%      Recent Lab Values     No lab values to display.      Allergies as of 5/6/2017        Reactions    Sulfa (Sulfonamide Antibiotics)     Sulfa (Sulfonamide Antibiotics) Hives      Advance Directives     An advance directive is a document which, in the event you are no longer able to make decisions for yourself, tells your healthcare team what kind of treatment you do or do not want to receive, or who you would like to make those decisions for you.  If you do not currently have an advance directive, [facility] encourages you to create one.  For more information call:  [phone]), [phone] ([phone]),  or log on to [email or web address].        Language Assistance Services     ATTENTION: Language assistance services are available, free of charge. Please call [phone].      ATENCIÓN: Si habla español, tiene a patiño disposición servicios gratuitos de asistencia lingüística. Llame al [phone].     CHÚ Ý: N?u b?n nói Ti?ng Vi?t, có các d?ch v? h? tr? ngôn ng? mi?n phí dành cho b?n. G?i s? [phone].        [facility] Sign-Up     Activating your [facility] account is as easy as 1-2-3!     1) Visit [email or web address], select Sign Up Now, enter this activation code and your date of birth, then select Next.  [email or web address]  Expires: 6/20/2017  3:36 PM      2) Create a username and password to use when you visit [facility] in the future and select a security question in case you lose your password and select Next.    3) Enter your e-mail address and click Sign Up!    Additional Information  If you have questions, please e-mail [email or web address] or call [phone] to talk to our [facility] staff. Remember, [facility] is NOT to be used for urgent needs. For medical emergencies, dial 911.          [facility] complies with applicable Federal civil rights laws and does not  discriminate on the basis of race, color, national origin, age, disability, or sex.

## 2017-05-16 ENCOUNTER — ROUTINE PRENATAL (OUTPATIENT)
Dept: OBSTETRICS AND GYNECOLOGY | Facility: CLINIC | Age: 22
End: 2017-05-16
Payer: MEDICAID

## 2017-05-16 VITALS
BODY MASS INDEX: 18.82 KG/M2 | WEIGHT: 106.25 LBS | DIASTOLIC BLOOD PRESSURE: 58 MMHG | SYSTOLIC BLOOD PRESSURE: 106 MMHG

## 2017-05-16 DIAGNOSIS — Z3A.22 22 WEEKS GESTATION OF PREGNANCY: Primary | ICD-10-CM

## 2017-05-16 PROCEDURE — 99213 OFFICE O/P EST LOW 20 MIN: CPT | Mod: TH,S$PBB,, | Performed by: OBSTETRICS & GYNECOLOGY

## 2017-05-16 PROCEDURE — 99212 OFFICE O/P EST SF 10 MIN: CPT | Mod: PBBFAC,PO | Performed by: OBSTETRICS & GYNECOLOGY

## 2017-05-16 PROCEDURE — 99999 PR PBB SHADOW E&M-EST. PATIENT-LVL II: CPT | Mod: PBBFAC,,, | Performed by: OBSTETRICS & GYNECOLOGY

## 2017-05-16 NOTE — PROGRESS NOTES
Baby actvie, fh 21, uterus soft, fht good,   Plan, keep apt with m wed 31, 1st floor  See me the week after

## 2017-05-19 ENCOUNTER — TELEPHONE (OUTPATIENT)
Dept: OBSTETRICS AND GYNECOLOGY | Facility: CLINIC | Age: 22
End: 2017-05-19

## 2017-05-19 NOTE — TELEPHONE ENCOUNTER
----- Message from Kelsey Alexander sent at 5/19/2017 10:56 AM CDT -----  Ivan with [facility] called earlier.   OB patient needs extractions.  Clearance for dental care was faxed.   Fax no. [phone]

## 2017-05-30 ENCOUNTER — TELEPHONE (OUTPATIENT)
Dept: MATERNAL FETAL MEDICINE | Facility: CLINIC | Age: 22
End: 2017-05-30

## 2017-05-30 NOTE — TELEPHONE ENCOUNTER
Discussed with pt rescheduling her MFM consult to [facility] due to schedule changes. Pt was originally scheduled at [facility] for 5/31/17 but there will not be a provider available for ultrasound and consult. Multiple messages were left for the patient starting on 5/23/17.    Today pt states that she does not have a ride to [place] but will call our office back to schedule. Informed pt that I will keep her appt scheduled for 6/2/17 at 240p until she calls to reschedule.    Pt verbalized understanding of information.

## 2017-06-02 DIAGNOSIS — O28.9 ABNORMAL FINDING ON ANTENATAL SCREEN: Primary | ICD-10-CM

## 2017-06-06 ENCOUNTER — ROUTINE PRENATAL (OUTPATIENT)
Dept: OBSTETRICS AND GYNECOLOGY | Facility: CLINIC | Age: 22
End: 2017-06-06
Payer: MEDICAID

## 2017-06-06 VITALS — DIASTOLIC BLOOD PRESSURE: 50 MMHG | BODY MASS INDEX: 19.14 KG/M2 | WEIGHT: 108 LBS | SYSTOLIC BLOOD PRESSURE: 120 MMHG

## 2017-06-06 PROCEDURE — 99213 OFFICE O/P EST LOW 20 MIN: CPT | Mod: S$PBB,,, | Performed by: OBSTETRICS & GYNECOLOGY

## 2017-06-06 PROCEDURE — 99999 PR PBB SHADOW E&M-EST. PATIENT-LVL II: CPT | Mod: PBBFAC,,, | Performed by: OBSTETRICS & GYNECOLOGY

## 2017-06-06 PROCEDURE — 99212 OFFICE O/P EST SF 10 MIN: CPT | Mod: PBBFAC,PO | Performed by: OBSTETRICS & GYNECOLOGY

## 2017-06-07 ENCOUNTER — HOSPITAL ENCOUNTER (OUTPATIENT)
Facility: HOSPITAL | Age: 22
Discharge: HOME | End: 2017-06-07
Attending: OBSTETRICS & GYNECOLOGY | Admitting: OBSTETRICS & GYNECOLOGY
Payer: MEDICAID

## 2017-06-07 VITALS
SYSTOLIC BLOOD PRESSURE: 88 MMHG | RESPIRATION RATE: 17 BRPM | OXYGEN SATURATION: 90 % | HEART RATE: 78 BPM | TEMPERATURE: 98 F | DIASTOLIC BLOOD PRESSURE: 54 MMHG

## 2017-06-07 DIAGNOSIS — O26.892 ABDOMINAL PAIN IN PREGNANCY, SECOND TRIMESTER: ICD-10-CM

## 2017-06-07 DIAGNOSIS — R10.9 ABDOMINAL PAIN IN PREGNANCY, SECOND TRIMESTER: ICD-10-CM

## 2017-06-07 PROBLEM — O26.899 ABDOMINAL PAIN IN PREGNANCY: Status: ACTIVE | Noted: 2017-06-07

## 2017-06-07 LAB
BACTERIA #/AREA URNS HPF: ABNORMAL /HPF
BILIRUB UR QL STRIP: NEGATIVE
CLARITY UR: ABNORMAL
COLOR UR: YELLOW
GLUCOSE UR QL STRIP: NEGATIVE
HGB UR QL STRIP: NEGATIVE
KETONES UR QL STRIP: NEGATIVE
LEUKOCYTE ESTERASE UR QL STRIP: ABNORMAL
MICROSCOPIC COMMENT: ABNORMAL
NITRITE UR QL STRIP: NEGATIVE
PH UR STRIP: 7 [PH] (ref 5–8)
PROT UR QL STRIP: NEGATIVE
RBC #/AREA URNS HPF: 2 /HPF (ref 0–4)
SP GR UR STRIP: 1.01 (ref 1–1.03)
SQUAMOUS #/AREA URNS HPF: ABNORMAL /HPF
URN SPEC COLLECT METH UR: ABNORMAL
UROBILINOGEN UR STRIP-ACNC: NEGATIVE EU/DL
WBC #/AREA URNS HPF: 8 /HPF (ref 0–5)

## 2017-06-07 PROCEDURE — G0378 HOSPITAL OBSERVATION PER HR: HCPCS

## 2017-06-07 PROCEDURE — 99211 OFF/OP EST MAY X REQ PHY/QHP: CPT

## 2017-06-07 PROCEDURE — 63600175 PHARM REV CODE 636 W HCPCS: Performed by: OBSTETRICS & GYNECOLOGY

## 2017-06-07 PROCEDURE — 81000 URINALYSIS NONAUTO W/SCOPE: CPT

## 2017-06-07 RX ORDER — KETOROLAC TROMETHAMINE 30 MG/ML
30 INJECTION, SOLUTION INTRAMUSCULAR; INTRAVENOUS ONCE
Status: COMPLETED | OUTPATIENT
Start: 2017-06-07 | End: 2017-06-07

## 2017-06-07 RX ADMIN — KETOROLAC TROMETHAMINE 30 MG: 30 INJECTION, SOLUTION INTRAMUSCULAR; INTRAVENOUS at 07:06

## 2017-06-16 ENCOUNTER — TELEPHONE (OUTPATIENT)
Dept: OBSTETRICS AND GYNECOLOGY | Facility: CLINIC | Age: 22
End: 2017-06-16

## 2017-06-16 NOTE — TELEPHONE ENCOUNTER
----- Message from Abbi Nj sent at 6/16/2017  4:22 PM CDT -----  Contact: self/[phone]  Patient would like to speak with the doctor in regard to medication from the dentist.    Please advise.

## 2017-06-16 NOTE — TELEPHONE ENCOUNTER
Patient states she had two teeth pulled on June 14, 2017  She was given 20 tablet of hydrocodone and tylenol #3  Patient states her dentist office needed the okay from our office for her to get additional pain medication.  Informed the patient that the doctor would not give the okay for additional pain medication considering the amount she just received on the 14th and she is pregnant.

## 2017-06-17 ENCOUNTER — NURSE TRIAGE (OUTPATIENT)
Dept: ADMINISTRATIVE | Facility: CLINIC | Age: 22
End: 2017-06-17

## 2017-06-17 NOTE — TELEPHONE ENCOUNTER
"  Reason for Disposition   Slight spotting after sexual intercourse (brief episode) (all triage questions negative)    Answer Assessment - Initial Assessment Questions  1. ONSET: "When did this bleeding start?"         Last night after sex  2. DESCRIPTION: "Describe the bleeding that you are having." "How much bleeding is there?"     - SPOTTING: spotting, or pinkish / brownish mucous discharge; does not fill panti-liner or pad     - MILD:  less than 1 pad / hour; less than patient's usual menstrual bleeding    - MODERATE: 1-2 pads / hour; small-medium blood clots (e.g., pea, grape, small coin)     - SEVERE: soaking 2 or more pads/hour for 2 or more hours; bleeding not contained by pads or continuous red blood from vagina; large blood clots (e.g., golf ball, large coin)       Dark red  3. ABDOMINAL PAIN SEVERITY: If present, ask: "How bad is it?"  (e.g., Scale 1-10; mild, moderate, or severe)    - MILD (1-3): doesn't interfere with normal activities, abdomen soft and not tender to touch     - MODERATE (4-7): interferes with normal activities or awakens from sleep, tender to touch     - SEVERE (8-10): excruciating pain, doubled over, unable to do any normal activities      no  4. PREGNANCY: "Do you know how many weeks or months pregnant you are?"       26 5/7weeks  5. CAMERON: "What date are you expecting to deliver?"      9/18/2017  6. FETAL MOVEMENT: "Has the baby's movement decreased or changed significantly from normal?"      yes  7. HEMODYNAMIC STATUS: "Are you weak or feeling lightheaded?" If so, ask: "Can you stand and walk normally?"       Slight weakness during pregnancy  8. OTHER SYMPTOMS: "What other symptoms are you having with the bleeding?" (e.g., leaking fluid from vagina, contractions)      spotting    Protocols used: ST PREGNANCY - VAGINAL BLEEDING GREATER THAN 20 WEEKS EGA-A-AH    "

## 2017-06-19 ENCOUNTER — TELEPHONE (OUTPATIENT)
Dept: OBSTETRICS AND GYNECOLOGY | Facility: CLINIC | Age: 22
End: 2017-06-19

## 2017-06-19 NOTE — TELEPHONE ENCOUNTER
Left message for patient to return call.    Patient contacted the nurse on call over the weekend reporting vaginal spotting after intercourse. Patient also contacted Dr. Chung on 6-16-17, requesting approval for a refill of hydrocodone #20 from her dentist that she received on 6-14 from her dentist for a tooth extraction.

## 2017-07-29 ENCOUNTER — ANESTHESIA EVENT (OUTPATIENT)
Dept: OBSTETRICS AND GYNECOLOGY | Facility: OTHER | Age: 22
End: 2017-07-29
Payer: MEDICAID

## 2017-07-29 ENCOUNTER — ANESTHESIA (OUTPATIENT)
Dept: OBSTETRICS AND GYNECOLOGY | Facility: OTHER | Age: 22
End: 2017-07-29
Payer: MEDICAID

## 2017-07-29 ENCOUNTER — HOSPITAL ENCOUNTER (INPATIENT)
Facility: OTHER | Age: 22
LOS: 3 days | Discharge: HOME | End: 2017-08-01
Attending: OBSTETRICS & GYNECOLOGY | Admitting: OBSTETRICS & GYNECOLOGY
Payer: MEDICAID

## 2017-07-29 DIAGNOSIS — O60.00 PRETERM LABOR: ICD-10-CM

## 2017-07-29 DIAGNOSIS — O26.899 ABDOMINAL CRAMPING COMPLICATING PREGNANCY: ICD-10-CM

## 2017-07-29 DIAGNOSIS — O46.93 VAGINAL BLEEDING IN PREGNANCY, THIRD TRIMESTER: ICD-10-CM

## 2017-07-29 DIAGNOSIS — O60.02 PRETERM LABOR IN SECOND TRIMESTER WITHOUT DELIVERY: ICD-10-CM

## 2017-07-29 DIAGNOSIS — R10.9 ABDOMINAL CRAMPING COMPLICATING PREGNANCY: ICD-10-CM

## 2017-07-29 PROBLEM — O99.333 MATERNAL TOBACCO USE IN THIRD TRIMESTER: Chronic | Status: ACTIVE | Noted: 2017-07-29

## 2017-07-29 PROBLEM — O46.90 VAGINAL BLEEDING IN PREGNANCY: Status: ACTIVE | Noted: 2017-07-29

## 2017-07-29 LAB
ABO + RH BLD: NORMAL
AMPHET+METHAMPHET UR QL: NEGATIVE
BARBITURATES UR QL SCN>200 NG/ML: NEGATIVE
BASOPHILS # BLD AUTO: 0.03 K/UL
BASOPHILS # BLD AUTO: 0.03 K/UL
BASOPHILS NFR BLD: 0.2 %
BASOPHILS NFR BLD: 0.2 %
BENZODIAZ UR QL SCN>200 NG/ML: NEGATIVE
BLD GP AB SCN CELLS X3 SERPL QL: NORMAL
BZE UR QL SCN: NEGATIVE
CANNABINOIDS UR QL SCN: NORMAL
CREAT UR-MCNC: 72.1 MG/DL
DIFFERENTIAL METHOD: ABNORMAL
DIFFERENTIAL METHOD: ABNORMAL
EOSINOPHIL # BLD AUTO: 0.1 K/UL
EOSINOPHIL # BLD AUTO: 0.1 K/UL
EOSINOPHIL NFR BLD: 0.3 %
EOSINOPHIL NFR BLD: 0.3 %
ERYTHROCYTE [DISTWIDTH] IN BLOOD BY AUTOMATED COUNT: 12.9 %
ERYTHROCYTE [DISTWIDTH] IN BLOOD BY AUTOMATED COUNT: 12.9 %
ETHANOL UR-MCNC: <10 MG/DL
HCT VFR BLD AUTO: 39.2 %
HCT VFR BLD AUTO: 39.2 %
HGB BLD-MCNC: 13.6 G/DL
HGB BLD-MCNC: 13.6 G/DL
HIV1+2 IGG SERPL QL IA.RAPID: NEGATIVE
LYMPHOCYTES # BLD AUTO: 1.9 K/UL
LYMPHOCYTES # BLD AUTO: 1.9 K/UL
LYMPHOCYTES NFR BLD: 10.5 %
LYMPHOCYTES NFR BLD: 10.5 %
MCH RBC QN AUTO: 33.9 PG
MCH RBC QN AUTO: 33.9 PG
MCHC RBC AUTO-ENTMCNC: 34.7 G/DL
MCHC RBC AUTO-ENTMCNC: 34.7 G/DL
MCV RBC AUTO: 98 FL
MCV RBC AUTO: 98 FL
METHADONE UR QL SCN>300 NG/ML: NEGATIVE
MONOCYTES # BLD AUTO: 1 K/UL
MONOCYTES # BLD AUTO: 1 K/UL
MONOCYTES NFR BLD: 5.4 %
MONOCYTES NFR BLD: 5.4 %
NEUTROPHILS # BLD AUTO: 15.3 K/UL
NEUTROPHILS # BLD AUTO: 15.3 K/UL
NEUTROPHILS NFR BLD: 83.2 %
NEUTROPHILS NFR BLD: 83.2 %
OPIATES UR QL SCN: NEGATIVE
PCP UR QL SCN>25 NG/ML: NEGATIVE
PLATELET # BLD AUTO: 183 K/UL
PLATELET # BLD AUTO: 183 K/UL
PMV BLD AUTO: 12.4 FL
PMV BLD AUTO: 12.4 FL
RBC # BLD AUTO: 4.01 M/UL
RBC # BLD AUTO: 4.01 M/UL
TOXICOLOGY INFORMATION: NORMAL
WBC # BLD AUTO: 18.33 K/UL
WBC # BLD AUTO: 18.33 K/UL

## 2017-07-29 PROCEDURE — 62326 NJX INTERLAMINAR LMBR/SAC: CPT | Performed by: ANESTHESIOLOGY

## 2017-07-29 PROCEDURE — 27800517 HC TRAY,EPIDURAL-CONTINUOUS: Performed by: ANESTHESIOLOGY

## 2017-07-29 PROCEDURE — 99284 EMERGENCY DEPT VISIT MOD MDM: CPT | Mod: ,,, | Performed by: OBSTETRICS & GYNECOLOGY

## 2017-07-29 PROCEDURE — 63600175 PHARM REV CODE 636 W HCPCS: Performed by: ANESTHESIOLOGY

## 2017-07-29 PROCEDURE — 11000001 HC ACUTE MED/SURG PRIVATE ROOM

## 2017-07-29 PROCEDURE — 86900 BLOOD TYPING SEROLOGIC ABO: CPT

## 2017-07-29 PROCEDURE — 85025 COMPLETE CBC W/AUTO DIFF WBC: CPT

## 2017-07-29 PROCEDURE — 36415 COLL VENOUS BLD VENIPUNCTURE: CPT

## 2017-07-29 PROCEDURE — 99285 EMERGENCY DEPT VISIT HI MDM: CPT

## 2017-07-29 PROCEDURE — 86592 SYPHILIS TEST NON-TREP QUAL: CPT

## 2017-07-29 PROCEDURE — 86901 BLOOD TYPING SEROLOGIC RH(D): CPT

## 2017-07-29 PROCEDURE — 80307 DRUG TEST PRSMV CHEM ANLYZR: CPT

## 2017-07-29 PROCEDURE — 25000003 PHARM REV CODE 250: Performed by: STUDENT IN AN ORGANIZED HEALTH CARE EDUCATION/TRAINING PROGRAM

## 2017-07-29 PROCEDURE — 25000003 PHARM REV CODE 250: Performed by: ANESTHESIOLOGY

## 2017-07-29 PROCEDURE — 86703 HIV-1/HIV-2 1 RESULT ANTBDY: CPT

## 2017-07-29 PROCEDURE — 59409 OBSTETRICAL CARE: CPT | Mod: AA,,, | Performed by: ANESTHESIOLOGY

## 2017-07-29 PROCEDURE — 27200710 HC EPIDURAL INFUSION PUMP SET: Performed by: ANESTHESIOLOGY

## 2017-07-29 PROCEDURE — 63600175 PHARM REV CODE 636 W HCPCS: Performed by: STUDENT IN AN ORGANIZED HEALTH CARE EDUCATION/TRAINING PROGRAM

## 2017-07-29 PROCEDURE — 86703 HIV-1/HIV-2 1 RESULT ANTBDY: CPT | Mod: 91

## 2017-07-29 RX ORDER — SODIUM CITRATE AND CITRIC ACID MONOHYDRATE 334; 500 MG/5ML; MG/5ML
30 SOLUTION ORAL ONCE AS NEEDED
Status: ACTIVE | OUTPATIENT
Start: 2017-07-29 | End: 2017-07-29

## 2017-07-29 RX ORDER — FENTANYL CITRATE 50 UG/ML
INJECTION, SOLUTION INTRAMUSCULAR; INTRAVENOUS
Status: DISPENSED
Start: 2017-07-29 | End: 2017-07-30

## 2017-07-29 RX ORDER — FENTANYL CITRATE 50 UG/ML
INJECTION, SOLUTION INTRAMUSCULAR; INTRAVENOUS
Status: DISCONTINUED | OUTPATIENT
Start: 2017-07-29 | End: 2017-07-30

## 2017-07-29 RX ORDER — IBUPROFEN 400 MG/1
800 TABLET ORAL EVERY 8 HOURS
Status: CANCELLED | OUTPATIENT
Start: 2017-07-30

## 2017-07-29 RX ORDER — LIDOCAINE HYDROCHLORIDE AND EPINEPHRINE 15; 5 MG/ML; UG/ML
INJECTION, SOLUTION EPIDURAL
Status: DISCONTINUED | OUTPATIENT
Start: 2017-07-29 | End: 2017-07-30

## 2017-07-29 RX ORDER — BUPIVACAINE HYDROCHLORIDE 2.5 MG/ML
INJECTION, SOLUTION EPIDURAL; INFILTRATION; INTRACAUDAL
Status: DISCONTINUED | OUTPATIENT
Start: 2017-07-29 | End: 2017-07-30

## 2017-07-29 RX ORDER — CALCIUM CARBONATE 200(500)MG
500 TABLET,CHEWABLE ORAL DAILY PRN
Status: DISCONTINUED | OUTPATIENT
Start: 2017-07-29 | End: 2017-08-01 | Stop reason: HOSPADM

## 2017-07-29 RX ORDER — MISOPROSTOL 200 UG/1
600 TABLET ORAL
Status: CANCELLED | OUTPATIENT
Start: 2017-07-29

## 2017-07-29 RX ORDER — FAMOTIDINE 10 MG/ML
20 INJECTION INTRAVENOUS ONCE AS NEEDED
Status: ACTIVE | OUTPATIENT
Start: 2017-07-29 | End: 2017-07-29

## 2017-07-29 RX ORDER — SODIUM CHLORIDE, SODIUM LACTATE, POTASSIUM CHLORIDE, CALCIUM CHLORIDE 600; 310; 30; 20 MG/100ML; MG/100ML; MG/100ML; MG/100ML
INJECTION, SOLUTION INTRAVENOUS CONTINUOUS
Status: DISCONTINUED | OUTPATIENT
Start: 2017-07-29 | End: 2017-08-01 | Stop reason: HOSPADM

## 2017-07-29 RX ORDER — OXYTOCIN/RINGER'S LACTATE 20/1000 ML
2 PLASTIC BAG, INJECTION (ML) INTRAVENOUS CONTINUOUS
Status: DISCONTINUED | OUTPATIENT
Start: 2017-07-29 | End: 2017-08-01 | Stop reason: HOSPADM

## 2017-07-29 RX ORDER — FENTANYL/BUPIVACAINE/NS/PF 2MCG/ML-.1
PLASTIC BAG, INJECTION (ML) INJECTION CONTINUOUS
Status: DISCONTINUED | OUTPATIENT
Start: 2017-07-29 | End: 2017-08-01 | Stop reason: HOSPADM

## 2017-07-29 RX ORDER — BUPIVACAINE HYDROCHLORIDE 2.5 MG/ML
INJECTION, SOLUTION EPIDURAL; INFILTRATION; INTRACAUDAL
Status: DISPENSED
Start: 2017-07-29 | End: 2017-07-30

## 2017-07-29 RX ORDER — ONDANSETRON 8 MG/1
8 TABLET, ORALLY DISINTEGRATING ORAL EVERY 8 HOURS PRN
Status: DISCONTINUED | OUTPATIENT
Start: 2017-07-29 | End: 2017-08-01 | Stop reason: HOSPADM

## 2017-07-29 RX ORDER — METOCLOPRAMIDE HYDROCHLORIDE 5 MG/ML
10 INJECTION INTRAMUSCULAR; INTRAVENOUS ONCE AS NEEDED
Status: ACTIVE | OUTPATIENT
Start: 2017-07-29 | End: 2017-07-29

## 2017-07-29 RX ORDER — KETOROLAC TROMETHAMINE 30 MG/ML
30 INJECTION, SOLUTION INTRAMUSCULAR; INTRAVENOUS EVERY 6 HOURS
Status: CANCELLED | OUTPATIENT
Start: 2017-07-29 | End: 2017-07-30

## 2017-07-29 RX ORDER — FENTANYL/BUPIVACAINE/NS/PF 2MCG/ML-.1
PLASTIC BAG, INJECTION (ML) INJECTION
Status: DISPENSED
Start: 2017-07-29 | End: 2017-07-30

## 2017-07-29 RX ORDER — FENTANYL/BUPIVACAINE/NS/PF 2MCG/ML-.1
PLASTIC BAG, INJECTION (ML) INJECTION CONTINUOUS PRN
Status: DISCONTINUED | OUTPATIENT
Start: 2017-07-29 | End: 2017-07-30

## 2017-07-29 RX ADMIN — PENICILLIN G POTASSIUM 5 MILLION UNITS: 5000000 POWDER, FOR SOLUTION INTRAMUSCULAR; INTRAPLEURAL; INTRATHECAL; INTRAVENOUS at 03:07

## 2017-07-29 RX ADMIN — DEXTROSE 2.5 MILLION UNITS: 50 INJECTION, SOLUTION INTRAVENOUS at 11:07

## 2017-07-29 RX ADMIN — Medication 2 MILLI-UNITS/MIN: at 07:07

## 2017-07-29 RX ADMIN — BUPIVACAINE HYDROCHLORIDE 1 ML: 2.5 INJECTION, SOLUTION EPIDURAL; INFILTRATION; INTRACAUDAL; PERINEURAL at 03:07

## 2017-07-29 RX ADMIN — FENTANYL CITRATE 10 MCG: 50 INJECTION, SOLUTION INTRAMUSCULAR; INTRAVENOUS at 03:07

## 2017-07-29 RX ADMIN — SODIUM CHLORIDE, SODIUM LACTATE, POTASSIUM CHLORIDE, AND CALCIUM CHLORIDE 1000 ML: .6; .31; .03; .02 INJECTION, SOLUTION INTRAVENOUS at 03:07

## 2017-07-29 RX ADMIN — DEXTROSE 2.5 MILLION UNITS: 50 INJECTION, SOLUTION INTRAVENOUS at 07:07

## 2017-07-29 RX ADMIN — ONDANSETRON 8 MG: 8 TABLET, ORALLY DISINTEGRATING ORAL at 10:07

## 2017-07-29 RX ADMIN — LIDOCAINE HYDROCHLORIDE,EPINEPHRINE BITARTRATE 3 ML: 15; .005 INJECTION, SOLUTION EPIDURAL; INFILTRATION; INTRACAUDAL; PERINEURAL at 03:07

## 2017-07-29 RX ADMIN — CALCIUM CARBONATE (ANTACID) CHEW TAB 500 MG 500 MG: 500 CHEW TAB at 08:07

## 2017-07-29 RX ADMIN — Medication 10 ML/HR: at 03:07

## 2017-07-29 NOTE — ED PROVIDER NOTES
Encounter Date: 7/29/2017       History   No chief complaint on file.    Pt was transferred via EMS from [place] for contractions and a dilated cervix. Cervix there was found to be 4 cm dilated. She received one dose of BMZ at the outside hospital. On arrival in the ED cervix was 6/90/-2 with a bulging bag. She reports painful contractions, vaginal bleeding. Denies LOF and reports good FM.     Pt has a history of Abnormal quad screen - increased risk for NTD followed up with u/s showing normal fetal anatomy and appropriate growth at 19+3. She smoked cigarettes during the pregnancy and took hydrocodone intermittently for dental pain.              Review of patient's allergies indicates:   Allergen Reactions    Sulfa (sulfonamide antibiotics)     Sulfa (sulfonamide antibiotics) Hives     Past Medical History:   Diagnosis Date    Back pain     Depression     H/O psychiatric hospitalization      Past Surgical History:   Procedure Laterality Date    INNER EAR SURGERY      TYMPANOPLASTY       Family History   Problem Relation Age of Onset    Hypertension Father     Diabetes Father     Hypertension Mother     Diabetes Mother      Social History   Substance Use Topics    Smoking status: Former Smoker     Types: Cigarettes    Smokeless tobacco: Never Used    Alcohol use No     Review of Systems   Constitutional: Negative for chills, diaphoresis and fever.   HENT: Negative for nosebleeds and sore throat.    Eyes: Negative for photophobia and visual disturbance.   Respiratory: Negative for cough and shortness of breath.    Cardiovascular: Negative for chest pain.   Gastrointestinal: Positive for abdominal pain. Negative for constipation, diarrhea, nausea and vomiting.   Genitourinary: Positive for vaginal bleeding and vaginal pain. Negative for dysuria, flank pain and vaginal discharge.   Musculoskeletal: Negative for back pain.   Skin: Negative for rash.   Neurological: Negative for syncope, weakness and  headaches.   Hematological: Does not bruise/bleed easily.       Physical Exam     Initial Vitals   BP Pulse Resp Temp SpO2   17 1600 17 1608 17 1608 17 1608 --   113/68 83 18 98 °F (36.7 °C)       MAP       17 1600       83         Physical Exam    Constitutional: She appears well-developed and well-nourished. She is not diaphoretic. No distress.   HENT:   Head: Normocephalic and atraumatic.   Eyes: Conjunctivae are normal.   Neck: Normal range of motion.   Cardiovascular: Normal rate, regular rhythm, normal heart sounds and intact distal pulses.   Pulmonary/Chest: Breath sounds normal. No respiratory distress.   Abdominal: Soft. Bowel sounds are normal.   Genitourinary: Vagina normal.   Genitourinary Comments: Cervical exam - /-2 with bulging bag   Neurological: She is alert and oriented to person, place, and time.         ED Course   Procedures  Labs Reviewed - No data to display          Medical Decision Making:   Initial Assessment:   Pt was transferred via EMS from [place] for contractions and a dilated cervix.  ED Management:  Pt found to be in  labor.   Admit to L&D for expectant management of labor.  Presentation confirmed vertex and consents signed and in chart.                   ED Course     Clinical Impression:   The primary encounter diagnosis was  labor. Diagnoses of Vaginal bleeding in pregnancy, third trimester and Abdominal cramping complicating pregnancy were also pertinent to this visit.                           John Sousa MD  Resident  [phone]

## 2017-07-29 NOTE — HPI
Pt was transferred via EMS from [place] for contractions and a dilated cervix. Cervix there was found to be 4 cm dilated. She received one dose of BMZ at the outside hospital. On arrival in the ED cervix was 6/90/-2 with a bulging bag. She was moved to the Labor and Delivery unit, vertex presentation was confirmed, and consents were signed. Pt desires an epidural now.     Pt has a history of Abnormal quad screen - increased risk for NTD followed up with u/s showing normal fetal anatomy and appropriate growth at 19+3. She smoked cigarettes during the pregnancy and took hydrocodone intermittently for dental pain.

## 2017-07-29 NOTE — H&P
[facility]  Obstetrics  History & Physical    Patient Name: Raya Kent  MRN: [id number]  Admission Date: 2017  Primary Care Provider: Primary Doctor No    Subjective:     Principal Problem:  labor    History of Present Illness:  Pt was transferred via EMS from [place] for contractions and a dilated cervix. Cervix there was found to be 4 cm dilated. She received one dose of BMZ at the outside hospital. On arrival in the ED cervix was 6/90/-2 with a bulging bag. She reports painful contractions, vaginal bleeding. Denies LOF and reports good FM.    She was moved to the Labor and Delivery unit, vertex presentation was confirmed, and consents were signed. Pt has not eaten today and desires an epidural now.     Pt has a history of Abnormal quad screen - increased risk for NTD followed up with u/s showing normal fetal anatomy and appropriate growth at 19+3. She smoked cigarettes during the pregnancy and took hydrocodone intermittently for dental pain.           Obstetric History       T0      L0     SAB0   TAB0   Ectopic0   Multiple0   Live Births0       # Outcome Date GA Lbr Kayode/2nd Weight Sex Delivery Anes PTL Lv   1 Current                 Past Medical History:   Diagnosis Date    Back pain     Depression     H/O psychiatric hospitalization      Past Surgical History:   Procedure Laterality Date    INNER EAR SURGERY      TYMPANOPLASTY         No prescriptions prior to admission.       Review of patient's allergies indicates:   Allergen Reactions    Sulfa (sulfonamide antibiotics)     Sulfa (sulfonamide antibiotics) Hives        Family History     Problem Relation (Age of Onset)    Diabetes Father, Mother    Hypertension Father, Mother        Social History Main Topics    Smoking status: Former Smoker     Types: Cigarettes    Smokeless tobacco: Never Used    Alcohol use No    Drug use: No    Sexual activity: Yes     Partners: Male     Review of Systems    Constitutional: Negative for appetite change, chills and fever.   Eyes: Negative for visual disturbance.   Respiratory: Negative for cough.    Cardiovascular: Negative for chest pain.   Gastrointestinal: Positive for abdominal pain. Negative for diarrhea, nausea and vomiting.   Endocrine: Negative for diabetes.   Genitourinary: Positive for vaginal bleeding and vaginal pain. Negative for vaginal discharge.   Musculoskeletal: Positive for back pain.   Neurological: Negative for headaches.   Hematological: Does not bruise/bleed easily.      Objective:     Vital Signs (Most Recent):    Vital Signs (24h Range):           There is no height or weight on file to calculate BMI.    FHT: Cat 1 (reassuring)  TOCO: Q 3-6 minutes    Physical Exam:   Constitutional: She is oriented to person, place, and time. She appears well-developed and well-nourished.   Poor dentition    HENT:   Head: Normocephalic and atraumatic.    Eyes: Conjunctivae are normal.    Neck: Neck supple.    Cardiovascular: Normal rate, regular rhythm and normal heart sounds.  Exam reveals no edema.     Pulmonary/Chest: Effort normal and breath sounds normal. No respiratory distress.        Abdominal: Soft. Bowel sounds are normal. She exhibits distension (fundal height c/w dates.). There is no tenderness. There is no rebound and no guarding.     Genitourinary: Vagina normal.           Musculoskeletal: Normal range of motion.       Neurological: She is alert and oriented to person, place, and time.    Skin: Skin is warm and dry.    Psychiatric: She has a normal mood and affect. Her behavior is normal. Judgment and thought content normal.       Cervix:  Dilation:  6  Effacement:  90  Station: -2  Presentation: Vertex     Significant Labs:  Lab Results   Component Value Date    GROUPTRH A POS 01/18/2017    HEPBSAG Negative 04/13/2017       I have personallly reviewed all pertinent lab results from the last 24 hours.    Assessment/Plan:     22 y.o. female   at 32w5d for:     labor    - Consents signed and to chart  - Admit to Labor and Delivery unit  - Plan for active for labor management    - Epidural per Anesthesia  - Draw CBC, T&S, 3T labs  - Staff notified  - Ultrasound performed, fetus in vertex position.  - Recheck in 2 hrs or PRN  - Post-Partum Hemorrhage risk - low        GBS Status Unknown:   - Started PCN prophylaxis     Tobacco use in pregnancy:   - Cigarette use   - Urine tox screen ordered   - Social work consult ordered     Plan discussed with Dr. Carter.   Anticipated delivery as patient is in active labor.  Plan for expectant management.  John Sousa MD  Obstetrics  [facility]      Jose Elias Monroe MD

## 2017-07-29 NOTE — SUBJECTIVE & OBJECTIVE
Obstetric History       T0      L0     SAB0   TAB0   Ectopic0   Multiple0   Live Births0       # Outcome Date GA Lbr Kayode/2nd Weight Sex Delivery Anes PTL Lv   1 Current                 Past Medical History:   Diagnosis Date    Back pain     Depression     H/O psychiatric hospitalization      Past Surgical History:   Procedure Laterality Date    INNER EAR SURGERY      TYMPANOPLASTY         No prescriptions prior to admission.       Review of patient's allergies indicates:   Allergen Reactions    Sulfa (sulfonamide antibiotics)     Sulfa (sulfonamide antibiotics) Hives        Family History     Problem Relation (Age of Onset)    Diabetes Father, Mother    Hypertension Father, Mother        Social History Main Topics    Smoking status: Former Smoker     Types: Cigarettes    Smokeless tobacco: Never Used    Alcohol use No    Drug use: No    Sexual activity: Yes     Partners: Male     Review of Systems   Constitutional: Negative for appetite change, chills and fever.   Eyes: Negative for visual disturbance.   Respiratory: Negative for cough.    Cardiovascular: Negative for chest pain.   Gastrointestinal: Positive for abdominal pain. Negative for diarrhea, nausea and vomiting.   Endocrine: Negative for diabetes.   Genitourinary: Positive for vaginal bleeding and vaginal pain. Negative for vaginal discharge.   Musculoskeletal: Positive for back pain.   Neurological: Negative for headaches.   Hematological: Does not bruise/bleed easily.      Objective:     Vital Signs (Most Recent):    Vital Signs (24h Range):           There is no height or weight on file to calculate BMI.    FHT: Cat 1 (reassuring)  TOCO: Q 3-6 minutes    Physical Exam:   Constitutional: She is oriented to person, place, and time. She appears well-developed and well-nourished.   Poor dentition    HENT:   Head: Normocephalic and atraumatic.    Eyes: Conjunctivae are normal.    Neck: Neck supple.    Cardiovascular: Normal rate,  regular rhythm and normal heart sounds.  Exam reveals no edema.     Pulmonary/Chest: Effort normal and breath sounds normal. No respiratory distress.        Abdominal: Soft. Bowel sounds are normal. She exhibits distension (fundal height c/w dates.). There is no tenderness. There is no rebound and no guarding.     Genitourinary: Vagina normal.           Musculoskeletal: Normal range of motion.       Neurological: She is alert and oriented to person, place, and time.    Skin: Skin is warm and dry.    Psychiatric: She has a normal mood and affect. Her behavior is normal. Judgment and thought content normal.       Cervix:  Dilation:  6  Effacement:  90  Station: -2  Presentation: Vertex     Significant Labs:  Lab Results   Component Value Date    GROUPTRH A POS 01/18/2017    HEPBSAG Negative 04/13/2017       I have personallly reviewed all pertinent lab results from the last 24 hours.

## 2017-07-29 NOTE — ANESTHESIA PREPROCEDURE EVALUATION
2017  Raya Kent is a 22 y.o., female  @ 32 5/7 weeks who was transferred via EMS from [place] for contractions and a dilated cervix. Cervix there was found to be 4 cm dilated. She received one dose of BMZ at the outside hospital. On arrival in the ED cervix was 6/90/-2 with a bulging bag.     Pt has a history of Abnormal quad screen - increased risk for NTD followed up with u/s showing normal fetal anatomy and appropriate growth at 19+3. She smoked cigarettes during the pregnancy and took hydrocodone intermittently for dental pain (teeth recently pulled). She has a PMH of PID at 13 yo.    OB History      Para Term  AB Living    1 0 0 0 0      SAB TAB Ectopic Multiple Live Births    0 0 0              Patient Active Problem List   Diagnosis    Abdominal pain    Abdominal pain in pregnancy     labor       Review of patient's allergies indicates:   Allergen Reactions    Sulfa (sulfonamide antibiotics)     Sulfa (sulfonamide antibiotics) Hives        No current facility-administered medications on file prior to encounter.      No current outpatient prescriptions on file prior to encounter.       Past Surgical History:   Procedure Laterality Date    INNER EAR SURGERY      TYMPANOPLASTY         Social History     Social History    Marital status: Single     Spouse name: N/A    Number of children: N/A    Years of education: N/A     Occupational History    Not on file.     Social History Main Topics    Smoking status: Former Smoker     Types: Cigarettes    Smokeless tobacco: Never Used    Alcohol use No    Drug use: No    Sexual activity: Yes     Partners: Male     Other Topics Concern    Not on file     Social History Narrative    ** Merged History Encounter **              Vital Signs Range (Last 24H):         CBC:   Recent Labs      17   1538   WBC  18.33*   18.33*   RBC  4.01  4.01   HGB  13.6  13.6   HCT  39.2  39.2   PLT  183  183   MCV  98  98   MCH  33.9*  33.9*   MCHC  34.7  34.7       CMP: No results for input(s): NA, K, CL, CO2, BUN, CREATININE, GLU, MG, PHOS, CALCIUM, ALBUMIN, PROT, ALKPHOS, ALT, AST, BILITOT in the last 72 hours.    INR  No results for input(s): INR, PROTIME, APTT in the last 72 hours.    Invalid input(s): PT        Anesthesia Evaluation    I have reviewed the Patient Summary Reports.     I have reviewed the Nursing Notes.   I have reviewed the Medications.     Review of Systems  Anesthesia Hx:  No problems with previous Anesthesia  History of prior surgery of interest to airway management or planning: Denies Family Hx of Anesthesia complications.   Denies Personal Hx of Anesthesia complications.   Social:  Smoker    Hematology/Oncology:  Hematology Normal   Oncology Normal     EENT/Dental:EENT/Dental Normal   Cardiovascular:  Cardiovascular Normal     Pulmonary:  Pulmonary Normal    Renal/:  Renal/ Normal     Hepatic/GI:  Hepatic/GI Normal    Musculoskeletal:  Musculoskeletal Normal    Neurological:  Neurology Normal    Endocrine:  Endocrine Normal    Psych:   Psychiatric History          Physical Exam  General:  Well nourished    Airway/Jaw/Neck:  Airway Findings: Mouth Opening: Normal Tongue: Normal  General Airway Assessment: Adult  Mallampati: II  TM Distance: Normal, at least 6 cm      Dental:  Dental Findings: Periodontal disease, Mild    Chest/Lungs:  Chest/Lungs Findings: Clear to auscultation, Normal Respiratory Rate     Heart/Vascular:  Heart Findings: Rate: Normal  Rhythm: Regular Rhythm  Sounds: Normal        Mental Status:  Mental Status Findings:  Cooperative, Alert and Oriented         Anesthesia Plan  Type of Anesthesia, risks & benefits discussed:  Anesthesia Type:  CSE, epidural, general, spinal  Patient's Preference:   Intra-op Monitoring Plan: standard ASA monitors  Intra-op Monitoring Plan Comments:   Post Op  Pain Control Plan: epidural analgesia  Post Op Pain Control Plan Comments:   Induction:    Beta Blocker:  Patient is not currently on a Beta-Blocker (No further documentation required).       Informed Consent: Patient understands risks and agrees with Anesthesia plan.  Questions answered. Anesthesia consent signed with patient.  ASA Score: 2     Day of Surgery Review of History & Physical:    H&P update referred to the surgeon.         Ready For Surgery From Anesthesia Perspective.

## 2017-07-29 NOTE — HOSPITAL COURSE
2017 - Pt was admitted to L&D for management of labor. BMZ x 1 given at [facility] prior to arrival.   2017 - PPD #1 s/p  of  male infant now in NICU. C/o low abdominal and low back pain. WBC pre 18.33 > post 18.05. Temp: [98.1 °F (36.7 °C)-98.2 °F (36.8 °C)] 98.2 °F (36.8 °C). Pt otherwise doing well and meeting PP milestones.  2017 - PPD #2 s/p . Still c/o lower abdominal and lower back pain. VSS. Pt otherwise doing well and has met all PP milestones.

## 2017-07-29 NOTE — ASSESSMENT & PLAN NOTE
- Consents signed and to chart  - Admit to Labor and Delivery unit  - Plan for active for labor management    - Epidural per Anesthesia  - Draw CBC, T&S, 3T labs  - Staff notified  - Ultrasound performed, fetus in vertex position.  - Recheck in 2 hrs or PRN  - Post-Partum Hemorrhage risk - low

## 2017-07-29 NOTE — PROGRESS NOTES
"LABOR NOTE    S:  Complaints: No.  Epidural working:  Yes. Pt is sleeping in bed.    O: /78   Pulse 83   Temp 98 °F (36.7 °C)   Resp 18   Ht 5' 4" (1.626 m)   Wt 49 kg (108 lb)   LMP  (LMP Unknown)   Breastfeeding? No   BMI 18.54 kg/m²       FHT:  Cat 1 (reassuring)  CTX: q 4 minutes  SVE: /-2      ASSESSMENT:   22 y.o.  at 32w5d, premature labor. Utox positive for THC.    FHT reassuring    Active Hospital Problems    Diagnosis  POA     labor [O60.00]  Unknown      Resolved Hospital Problems    Diagnosis Date Resolved POA   No resolved problems to display.         PLAN:    Continue Close Maternal/Fetal Monitoring  Pitocin Augmentation per protocol  Recheck 2 hours or PRN      "

## 2017-07-29 NOTE — ANESTHESIA PROCEDURE NOTES
CSE    Patient location during procedure: OB  Start time: 7/29/2017 3:26 PM  Timeout: 7/29/2017 3:25 PM  End time: 7/29/2017 3:35 PM  Staffing  Anesthesiologist: MIKE JOHNSON  Resident/CRNA: JEFF LUTZ  Performed: resident/CRNA   Preanesthetic Checklist  Completed: patient identified, site marked, surgical consent, pre-op evaluation, timeout performed, IV checked, risks and benefits discussed and monitors and equipment checked  CSE  Patient position: left lateral decubitus  Prep: ChloraPrep  Patient monitoring: heart rate, continuous pulse ox and frequent blood pressure checks  Approach: midline  Spinal Needle  Needle type: pencil-tip   Needle gauge: 25 G  Needle length: 5 in  Epidural Needle  Injection technique: ARNALDO saline  Needle type: Tuohy   Needle gauge: 17 G  Needle length: 3.5 in  Needle insertion depth: 5 cm  Location: L3-4  Needle localization: anatomical landmarks  Catheter  Catheter type: [employer]  Catheter size: 19 G  Catheter at skin depth: 9 cm  Test dose: lidocaine 1.5% with Epi 1-to-200,000  Additional Documentation: incremental injection, negative aspiration for CSF, negative test dose, negative aspiration for heme, no paresthesia on injection and left transient paresthesia  Assessment  Intrathecal Medications:  Bolus administered: 1 mL of 0.25 bupivacaine  administered: primary anesthetic and 10 mcg of  fentanyl  Epinephrine added: none

## 2017-07-30 PROBLEM — O60.00 PRETERM LABOR: Status: ACTIVE | Noted: 2017-07-30

## 2017-07-30 PROBLEM — O60.00 PRETERM LABOR: Status: RESOLVED | Noted: 2017-07-30 | Resolved: 2017-07-30

## 2017-07-30 PROBLEM — O60.00 PRETERM LABOR: Status: RESOLVED | Noted: 2017-07-29 | Resolved: 2017-07-30

## 2017-07-30 PROBLEM — O46.90 VAGINAL BLEEDING IN PREGNANCY: Status: RESOLVED | Noted: 2017-07-29 | Resolved: 2017-07-30

## 2017-07-30 PROCEDURE — 27000181 HC CABLE, IUPC

## 2017-07-30 PROCEDURE — 88307 TISSUE EXAM BY PATHOLOGIST: CPT | Performed by: PATHOLOGY

## 2017-07-30 PROCEDURE — 72100003 HC LABOR CARE, EA. ADDL. 8 HRS

## 2017-07-30 PROCEDURE — 72200005 HC VAGINAL DELIVERY LEVEL II

## 2017-07-30 PROCEDURE — 51702 INSERT TEMP BLADDER CATH: CPT

## 2017-07-30 PROCEDURE — 11000001 HC ACUTE MED/SURG PRIVATE ROOM

## 2017-07-30 PROCEDURE — 25000003 PHARM REV CODE 250

## 2017-07-30 PROCEDURE — 59409 OBSTETRICAL CARE: CPT | Mod: AT,,, | Performed by: OBSTETRICS & GYNECOLOGY

## 2017-07-30 PROCEDURE — 72100002 HC LABOR CARE, 1ST 8 HOURS

## 2017-07-30 PROCEDURE — 88307 TISSUE EXAM BY PATHOLOGIST: CPT | Mod: 26,,, | Performed by: PATHOLOGY

## 2017-07-30 PROCEDURE — 25000003 PHARM REV CODE 250: Performed by: STUDENT IN AN ORGANIZED HEALTH CARE EDUCATION/TRAINING PROGRAM

## 2017-07-30 RX ORDER — DIPHENHYDRAMINE HYDROCHLORIDE 50 MG/ML
25 INJECTION INTRAMUSCULAR; INTRAVENOUS EVERY 4 HOURS PRN
Status: CANCELLED | OUTPATIENT
Start: 2017-07-30

## 2017-07-30 RX ORDER — OXYCODONE AND ACETAMINOPHEN 10; 325 MG/1; MG/1
TABLET ORAL
Status: COMPLETED
Start: 2017-07-30 | End: 2017-07-30

## 2017-07-30 RX ORDER — DIPHENHYDRAMINE HCL 25 MG
25 CAPSULE ORAL EVERY 4 HOURS PRN
Status: DISCONTINUED | OUTPATIENT
Start: 2017-07-30 | End: 2017-08-01 | Stop reason: HOSPADM

## 2017-07-30 RX ORDER — ZOLPIDEM TARTRATE 5 MG/1
5 TABLET ORAL NIGHTLY PRN
Status: CANCELLED | OUTPATIENT
Start: 2017-07-30

## 2017-07-30 RX ORDER — IBUPROFEN 600 MG/1
600 TABLET ORAL EVERY 6 HOURS
Status: DISCONTINUED | OUTPATIENT
Start: 2017-07-30 | End: 2017-08-01 | Stop reason: HOSPADM

## 2017-07-30 RX ORDER — IBUPROFEN 600 MG/1
600 TABLET ORAL EVERY 6 HOURS
Status: CANCELLED | OUTPATIENT
Start: 2017-07-30

## 2017-07-30 RX ORDER — LIDOCAINE HYDROCHLORIDE 10 MG/ML
INJECTION INFILTRATION; PERINEURAL
Status: DISCONTINUED
Start: 2017-07-30 | End: 2017-07-30 | Stop reason: WASHOUT

## 2017-07-30 RX ORDER — ONDANSETRON 8 MG/1
8 TABLET, ORALLY DISINTEGRATING ORAL EVERY 8 HOURS PRN
Status: CANCELLED | OUTPATIENT
Start: 2017-07-30

## 2017-07-30 RX ORDER — CARBOPROST TROMETHAMINE 250 UG/ML
INJECTION, SOLUTION INTRAMUSCULAR
Status: DISCONTINUED
Start: 2017-07-30 | End: 2017-07-30 | Stop reason: WASHOUT

## 2017-07-30 RX ORDER — OXYTOCIN/RINGER'S LACTATE 20/1000 ML
41.65 PLASTIC BAG, INJECTION (ML) INTRAVENOUS CONTINUOUS
Status: ACTIVE | OUTPATIENT
Start: 2017-07-30 | End: 2017-07-30

## 2017-07-30 RX ORDER — IBUPROFEN 600 MG/1
600 TABLET ORAL EVERY 6 HOURS
Qty: 45 TABLET | Refills: 1 | Status: SHIPPED | OUTPATIENT
Start: 2017-07-30 | End: 2017-08-16

## 2017-07-30 RX ORDER — OXYCODONE AND ACETAMINOPHEN 10; 325 MG/1; MG/1
1 TABLET ORAL EVERY 4 HOURS PRN
Status: DISCONTINUED | OUTPATIENT
Start: 2017-07-30 | End: 2017-08-01 | Stop reason: HOSPADM

## 2017-07-30 RX ORDER — OXYCODONE AND ACETAMINOPHEN 10; 325 MG/1; MG/1
1 TABLET ORAL EVERY 4 HOURS PRN
Status: DISCONTINUED | OUTPATIENT
Start: 2017-07-30 | End: 2017-07-30 | Stop reason: SDUPTHER

## 2017-07-30 RX ORDER — HYDROCORTISONE 25 MG/G
CREAM TOPICAL 3 TIMES DAILY PRN
Status: DISCONTINUED | OUTPATIENT
Start: 2017-07-30 | End: 2017-08-01 | Stop reason: HOSPADM

## 2017-07-30 RX ORDER — OXYTOCIN/RINGER'S LACTATE 20/1000 ML
41.65 PLASTIC BAG, INJECTION (ML) INTRAVENOUS CONTINUOUS
Status: CANCELLED | OUTPATIENT
Start: 2017-07-30 | End: 2017-07-30

## 2017-07-30 RX ORDER — OXYTOCIN 10 [USP'U]/ML
INJECTION, SOLUTION INTRAMUSCULAR; INTRAVENOUS
Status: DISCONTINUED
Start: 2017-07-30 | End: 2017-07-30 | Stop reason: WASHOUT

## 2017-07-30 RX ORDER — METHYLERGONOVINE MALEATE 0.2 MG/ML
INJECTION INTRAVENOUS
Status: DISCONTINUED
Start: 2017-07-30 | End: 2017-07-30 | Stop reason: WASHOUT

## 2017-07-30 RX ORDER — DOCUSATE SODIUM 100 MG/1
200 CAPSULE, LIQUID FILLED ORAL 2 TIMES DAILY PRN
Status: CANCELLED | OUTPATIENT
Start: 2017-07-30

## 2017-07-30 RX ORDER — DIPHENHYDRAMINE HCL 25 MG
25 CAPSULE ORAL EVERY 4 HOURS PRN
Status: CANCELLED | OUTPATIENT
Start: 2017-07-30

## 2017-07-30 RX ORDER — ONDANSETRON 8 MG/1
8 TABLET, ORALLY DISINTEGRATING ORAL EVERY 8 HOURS PRN
Status: DISCONTINUED | OUTPATIENT
Start: 2017-07-30 | End: 2017-08-01 | Stop reason: HOSPADM

## 2017-07-30 RX ORDER — HYDROCORTISONE 25 MG/G
CREAM TOPICAL 3 TIMES DAILY PRN
Status: CANCELLED | OUTPATIENT
Start: 2017-07-30

## 2017-07-30 RX ORDER — OXYCODONE AND ACETAMINOPHEN 5; 325 MG/1; MG/1
1 TABLET ORAL EVERY 4 HOURS PRN
Status: DISCONTINUED | OUTPATIENT
Start: 2017-07-30 | End: 2017-08-01 | Stop reason: HOSPADM

## 2017-07-30 RX ORDER — MISOPROSTOL 200 UG/1
TABLET ORAL
Status: DISCONTINUED
Start: 2017-07-30 | End: 2017-07-30 | Stop reason: WASHOUT

## 2017-07-30 RX ORDER — OXYCODONE AND ACETAMINOPHEN 5; 325 MG/1; MG/1
1 TABLET ORAL EVERY 4 HOURS PRN
Status: DISCONTINUED | OUTPATIENT
Start: 2017-07-30 | End: 2017-07-30 | Stop reason: SDUPTHER

## 2017-07-30 RX ORDER — ACETAMINOPHEN 325 MG/1
650 TABLET ORAL EVERY 6 HOURS PRN
Status: CANCELLED | OUTPATIENT
Start: 2017-07-30

## 2017-07-30 RX ORDER — DOCUSATE SODIUM 100 MG/1
200 CAPSULE, LIQUID FILLED ORAL 2 TIMES DAILY PRN
Status: DISCONTINUED | OUTPATIENT
Start: 2017-07-30 | End: 2017-08-01 | Stop reason: HOSPADM

## 2017-07-30 RX ORDER — DIPHENHYDRAMINE HYDROCHLORIDE 50 MG/ML
25 INJECTION INTRAMUSCULAR; INTRAVENOUS EVERY 4 HOURS PRN
Status: DISCONTINUED | OUTPATIENT
Start: 2017-07-30 | End: 2017-08-01 | Stop reason: HOSPADM

## 2017-07-30 RX ORDER — TERBUTALINE SULFATE 1 MG/ML
INJECTION SUBCUTANEOUS
Status: DISCONTINUED
Start: 2017-07-30 | End: 2017-07-30 | Stop reason: WASHOUT

## 2017-07-30 RX ORDER — ACETAMINOPHEN 325 MG/1
650 TABLET ORAL EVERY 6 HOURS PRN
Status: DISCONTINUED | OUTPATIENT
Start: 2017-07-30 | End: 2017-08-01 | Stop reason: HOSPADM

## 2017-07-30 RX ADMIN — OXYCODONE HYDROCHLORIDE AND ACETAMINOPHEN 1 TABLET: 10; 325 TABLET ORAL at 07:07

## 2017-07-30 RX ADMIN — Medication 41.65 MILLI-UNITS/MIN: at 06:07

## 2017-07-30 RX ADMIN — Medication 333 MILLI-UNITS/MIN: at 06:07

## 2017-07-30 RX ADMIN — OXYCODONE HYDROCHLORIDE AND ACETAMINOPHEN 1 TABLET: 5; 325 TABLET ORAL at 04:07

## 2017-07-30 RX ADMIN — DEXTROSE 2.5 MILLION UNITS: 50 INJECTION, SOLUTION INTRAVENOUS at 03:07

## 2017-07-30 RX ADMIN — IBUPROFEN 600 MG: 600 TABLET, FILM COATED ORAL at 10:07

## 2017-07-30 RX ADMIN — IBUPROFEN 600 MG: 600 TABLET, FILM COATED ORAL at 04:07

## 2017-07-30 RX ADMIN — OXYCODONE HYDROCHLORIDE AND ACETAMINOPHEN 1 TABLET: 10; 325 TABLET ORAL at 10:07

## 2017-07-30 NOTE — PROGRESS NOTES
"Pt ambulated to bathroom with 2-person assist at 0902 but was unable to void. Pt states, "I don't even have to pee yet, I just want to go home." Pt transferred to room 602 via wheelchair, pt helped into bed and given call bell.  Also gave pt the phone number for the NICU nurse who has report on her baby ([phone], [phone]).  Report given to Ada.   "

## 2017-07-30 NOTE — PROGRESS NOTES
"LABOR NOTE    S:  Complaints: No.  Epidural working:  yes    O: /76   Pulse 90   Temp 97.1 °F (36.2 °C) (Temporal)   Resp 18   Ht 5' 4" (1.626 m)   Wt 49 kg (108 lb)   LMP  (LMP Unknown)   SpO2 100%   Breastfeeding? No   BMI 18.54 kg/m²       FHT: Cat 1 (reassuring)  CTX: q 3 minutes  SVE: 780/-2      ASSESSMENT:   22 y.o.  at 32w6d,  labor.    FHT reassuring    Active Hospital Problems    Diagnosis  POA     labor [O60.00]  Unknown    Vaginal bleeding in pregnancy [O46.90]  Yes    Maternal tobacco use in third trimester [O99.333]  Unknown     Chronic      Resolved Hospital Problems    Diagnosis Date Resolved POA   No resolved problems to display.         PLAN:  Place IUPC and titrate pitocin up if contractions are not adequate.   Continue Close Maternal/Fetal Monitoring  Pitocin Augmentation per protocol  Recheck 2 hours or PRN      "

## 2017-07-30 NOTE — PROGRESS NOTES
"LABOR NOTE    S:  Complaints: No.  Epidural working:  yes  PT had episode of vomiting after eating chips and popsicle. No n/v since that episode.    O: /77   Pulse 96   Temp 98.8 °F (37.1 °C) (Temporal)   Resp 18   Ht 5' 4" (1.626 m)   Wt 49 kg (108 lb)   LMP  (LMP Unknown)   SpO2 100%   Breastfeeding? No   BMI 18.54 kg/m²       FHT:  Cat 1 (reassuring)  CTX: q 2 minutes  SVE: /-2      ASSESSMENT:   22 y.o.  at 32w5d,  labor.    FHT reassuring    Active Hospital Problems    Diagnosis  POA     labor [O60.00]  Unknown    Vaginal bleeding in pregnancy [O46.90]  Yes    Maternal tobacco use in third trimester [O99.333]  Unknown     Chronic      Resolved Hospital Problems    Diagnosis Date Resolved POA   No resolved problems to display.         PLAN:    Continue Close Maternal/Fetal Monitoring  Pitocin Augmentation per protocol - Pitocin currently at 6.  Recheck 2 hours or PRN      "

## 2017-07-30 NOTE — L&D DELIVERY NOTE
cephalic if male infant at 33w6d in OR room 1 after approximately 15 minutes of maternal pushing.  Under epidural anesthesia.  Infant delivered OA over intact perineum.  Male infant also tolerated the delivery well. Cord was clamped and cut and infant was handed to NICU team.   Umbilical arterial gas and venous blood obtained.  No periurethral and posterior vaginal abrasions.   Placenta delivered spontaneously and IV pitocin given.  Fundal massage performed and uterine tone noted. No cervical lacerations.  Patient tolerated delivery well.  EBL 75 mL.  Staff present for entire procedure.  S/L/N counts correct x2.     Delivery Information for  Jesus Kent    Birth information:  YOB: 2015   Time of birth: 6:24 AM   Sex: male   Head Delivery Date/Time: 2017  6:24 AM   Delivery type: Vaginal, Spontaneous Delivery   Gestational Age: 32w6d    Delivery Providers    Delivering clinician:  Jose Elias Monroe Jr., MD   Other personnel:   Provider Role   Monica Crews MD Resident   John Sousa MD Resident   Yashira Sumner RN Registered Nurse   Radha Nagy RN Registered Nurse   Kaylie Ling [facility]               [place] Measurements    Weight:  1720 g Length:  42 cm   Head circum.:  29.5 cm            Assessment    Living status:  Living  Apgars:     1 Minute:   5 Minute:   10 Minute:   15 Minute:   20 Minute:     Skin Color:   1  1       Heart Rate:   2  2       Reflex Irritability:   2  2       Muscle Tone:   1  1       Respiratory Effort:   2  2       Total:   8  8               Apgars Assigned By:  NICU         Assisted Delivery Details:    Forceps attempted?:  No  Vacuum extractor attempted?:  No         Shoulder Dystocia    Shoulder dystocia present?:  No           Presentation and Position    Presentation:   Vertex   Position:       Occiput            Interventions/Resuscitation    Method:  NICU Attended       Cord    Vessels:  3 vessels  Complications:   None  Delayed Cord Clamping?:  No  Cord Clamped Date/Time:  2017  6:24 AM  Cord Blood Disposition:  Sent with Baby  Gases Sent?:  Yes  Stem Cell Collection (by MD):  No       Placenta    Date and time:  2017  6:28 AM  Removal:  Spontaneous  Appearance:  Intact  Placenta disposition:  pathology           Labor Events:       labor: Yes     Labor Onset Date/Time:         Dilation Complete Date/Time: 2017 05:55     Start Pushing Date/Time: 2017 06:12     Rupture Date/Time: 17  1730         Rupture type:           Fluid Amount:        Fluid Color:        Fluid Odor:        Membrane Status (PeriCalm): SRM (Spontaneous Rupture)      Rupture Date/Time (PeriCalm): 2017 17:30:00      Fluid Amount (PeriCalm): Moderate      Fluid Color (PeriCalm): Clear       steroids: Partial Course     Antibiotics given for GBS: Yes     Induction: none     Indications for induction:        Augmentation: oxytocin     Indications for augmentation: Ineffective Contraction Pattern     Labor complications: None     Additional complications:          Cervical ripening:                     Delivery:      Episiotomy: None     Indication for Episiotomy:       Perineal Lacerations: None Repaired:      Periurethral Laceration: none Repaired:     Labial Laceration: none Repaired:     Sulcus Laceration: none Repaired:     Vaginal Laceration: No Repaired:     Cervical Laceration: No Repaired:     Repair suture: None     Repair # of packets:       Vaginal delivery QBL (mL): 100      QBL (mL): 0     Combined Blood Loss (mL): 100     Vaginal Sweep Performed: Yes     Surgicount Correct: Yes       Other providers:       Anesthesia    Method:  Epidural          Details (if applicable):  Trial of Labor      Categorization:      Priority:     Indications for :     Incision Type:       Additional  information:  Forceps:    Vacuum:    Breech:    Observed anomalies    Other  (Comments):             Jose Elias Monroe MD

## 2017-07-30 NOTE — PROGRESS NOTES
"Late entry due to Epic downtime    LABOR NOTE    S:  Complaints: No.  Epidural working:  yes    O: BP (!) 113/59   Pulse 93   Temp 98.4 °F (36.9 °C) (Oral)   Resp 16   Ht 5' 4" (1.626 m)   Wt 49 kg (108 lb)   LMP  (LMP Unknown)   SpO2 100%   Breastfeeding? No   BMI 18.54 kg/m²     FHT: 125 baseline, moderate variability, + accels, rare variables Cat 2 (reassuring)  CTX: q 2 minutes  SVE: 9.5/95/0    ASSESSMENT:   22 y.o.  at 32w6d, normal labor    FHT reassuring    Active Hospital Problems    Diagnosis  POA    * (spontaneous vaginal delivery) [O80]  Not Applicable     labor [O60.00]  Yes    Maternal tobacco use in third trimester [O99.333]  Unknown     Chronic      Resolved Hospital Problems    Diagnosis Date Resolved POA     labor [O60.00] 2017 Yes     labor [O60.00] 2017 Unknown    Vaginal bleeding in pregnancy [O46.90] 2017 Yes         PLAN:    Continue Close Maternal/Fetal Monitoring  Pitocin Augmentation per protocol  Recheck 2 hours or PRN      Luis Martinez MD  PGY1, OBGYN [facility]    "

## 2017-07-30 NOTE — PROGRESS NOTES
"LABOR NOTE    S:  Complaints: No.  Epidural working:  yes  Pt was eating chips in bed when MD and nurse entered the room. She was asked to stop eating until after delivery.    O: /75   Pulse 80   Temp 98.4 °F (36.9 °C)   Resp 18   Ht 5' 4" (1.626 m)   Wt 49 kg (108 lb)   LMP  (LMP Unknown)   Breastfeeding? No   BMI 18.54 kg/m²       FHT:  Cat 1 (reassuring)  CTX: q 5 minutes  SVE: /-2      ASSESSMENT:   22 y.o.  at 32w5d, premature labor    FHT reassuring    Active Hospital Problems    Diagnosis  POA     labor [O60.00]  Unknown    Vaginal bleeding in pregnancy [O46.90]  Yes    Maternal tobacco use in third trimester [O99.333]  Unknown     Chronic      Resolved Hospital Problems    Diagnosis Date Resolved POA   No resolved problems to display.         PLAN:    Continue Close Maternal/Fetal Monitoring  Start Pitocin Augmentation per protocol now  Recheck 2 hours or PRN      "

## 2017-07-31 PROBLEM — O26.899 ABDOMINAL PAIN IN PREGNANCY: Status: RESOLVED | Noted: 2017-06-07 | Resolved: 2017-07-31

## 2017-07-31 PROBLEM — R10.9 ABDOMINAL PAIN: Status: RESOLVED | Noted: 2017-05-06 | Resolved: 2017-07-31

## 2017-07-31 PROBLEM — R10.9 ABDOMINAL PAIN IN PREGNANCY: Status: RESOLVED | Noted: 2017-06-07 | Resolved: 2017-07-31

## 2017-07-31 PROBLEM — O60.00 PRETERM LABOR: Status: RESOLVED | Noted: 2017-07-30 | Resolved: 2017-07-31

## 2017-07-31 LAB
BASOPHILS # BLD AUTO: 0.02 K/UL
BASOPHILS NFR BLD: 0.1 %
DIFFERENTIAL METHOD: ABNORMAL
EOSINOPHIL # BLD AUTO: 0 K/UL
EOSINOPHIL NFR BLD: 0.1 %
ERYTHROCYTE [DISTWIDTH] IN BLOOD BY AUTOMATED COUNT: 13.1 %
HCT VFR BLD AUTO: 35.5 %
HGB BLD-MCNC: 11.9 G/DL
HIV 1+2 AB+HIV1 P24 AG SERPL QL IA: NEGATIVE
LYMPHOCYTES # BLD AUTO: 2.2 K/UL
LYMPHOCYTES NFR BLD: 12.4 %
MCH RBC QN AUTO: 33.1 PG
MCHC RBC AUTO-ENTMCNC: 33.5 G/DL
MCV RBC AUTO: 99 FL
MONOCYTES # BLD AUTO: 1 K/UL
MONOCYTES NFR BLD: 5.5 %
NEUTROPHILS # BLD AUTO: 14.7 K/UL
NEUTROPHILS NFR BLD: 81.3 %
PLATELET # BLD AUTO: 177 K/UL
PMV BLD AUTO: 12.4 FL
RBC # BLD AUTO: 3.59 M/UL
RPR SER QL: NORMAL
WBC # BLD AUTO: 18.05 K/UL

## 2017-07-31 PROCEDURE — 99231 SBSQ HOSP IP/OBS SF/LOW 25: CPT | Mod: ,,, | Performed by: OBSTETRICS & GYNECOLOGY

## 2017-07-31 PROCEDURE — 36415 COLL VENOUS BLD VENIPUNCTURE: CPT

## 2017-07-31 PROCEDURE — 85025 COMPLETE CBC W/AUTO DIFF WBC: CPT

## 2017-07-31 PROCEDURE — 11000001 HC ACUTE MED/SURG PRIVATE ROOM

## 2017-07-31 PROCEDURE — 25000003 PHARM REV CODE 250: Performed by: STUDENT IN AN ORGANIZED HEALTH CARE EDUCATION/TRAINING PROGRAM

## 2017-07-31 RX ORDER — IBUPROFEN 200 MG
1 TABLET ORAL DAILY
Status: DISCONTINUED | OUTPATIENT
Start: 2017-07-31 | End: 2017-08-01 | Stop reason: HOSPADM

## 2017-07-31 RX ORDER — IBUPROFEN 200 MG
1 TABLET ORAL DAILY
Status: DISCONTINUED | OUTPATIENT
Start: 2017-08-01 | End: 2017-07-31

## 2017-07-31 RX ORDER — IBUPROFEN 200 MG
1 TABLET ORAL DAILY
Status: DISCONTINUED | OUTPATIENT
Start: 2017-07-31 | End: 2017-07-31

## 2017-07-31 RX ADMIN — IBUPROFEN 600 MG: 600 TABLET, FILM COATED ORAL at 06:07

## 2017-07-31 RX ADMIN — OXYCODONE HYDROCHLORIDE AND ACETAMINOPHEN 1 TABLET: 10; 325 TABLET ORAL at 09:07

## 2017-07-31 RX ADMIN — IBUPROFEN 600 MG: 600 TABLET, FILM COATED ORAL at 01:07

## 2017-07-31 RX ADMIN — OXYCODONE HYDROCHLORIDE AND ACETAMINOPHEN 1 TABLET: 5; 325 TABLET ORAL at 06:07

## 2017-07-31 RX ADMIN — OXYCODONE HYDROCHLORIDE AND ACETAMINOPHEN 1 TABLET: 5; 325 TABLET ORAL at 01:07

## 2017-07-31 NOTE — PROGRESS NOTES
Patient stated, after visiting  in NICU, she left and went to lunch off hospital grounds. Educated patient on the safety risk associated with that and informed her that hospital policy is to remain on hospital grounds. Patient verbalized understanding.

## 2017-07-31 NOTE — PLAN OF CARE
Problem: Patient Care Overview  Goal: Plan of Care Review  Outcome: Ongoing (interventions implemented as appropriate)  VSS. Patient ambulating and voiding w/o difficulty. Patient verbalized pain kept at tolerable level with current pain regimen. Fundus midline, firm, with light lochia. Significant other at bedside; parents visiting  in NICU.

## 2017-07-31 NOTE — ASSESSMENT & PLAN NOTE
Postpartum care:  - Patient doing well. Continue routine management and advances.  - Continue PO pain meds. Pain well controlled.  - Encourage ambulation.   - Heme: Pre Delivery h/h 13/39 --> Post Delivery h/h 12/35  - Leukocytosis - Predelivery WBC 18.33 --> Postdelivery 18.05  - Male infant in NICU  - Contraception - ASK  - Lactation - The patient is bottle feeding. Lactation nurse following along PRN  - Rh Status - POS

## 2017-07-31 NOTE — PLAN OF CARE
Sw attempted to see pt.  Pt was not in room.  Sw went to NICU in an attempt to see pt; however, pt was not there either.  Sw to make another attempt to see pt.  Will follow.    [facility]ley Sloan [facility]  NICU   Ext. [phone]-phone  [email or web address]

## 2017-07-31 NOTE — SUBJECTIVE & OBJECTIVE
Hospital course: 2017 - Pt was admitted to L&D for management of labor. BMZ x 1 given at [facility] prior to arrival.   2017 - PPD #1 s/p  of  male infant now in NICU. C/o low abdominal and low back pain. WBC pre 18.33 > post 18.05. Temp: [98.1 °F (36.7 °C)-98.2 °F (36.8 °C)] 98.2 °F (36.8 °C) . Pt otherwise doing well and meeting PP milestones.      Interval History:     She is doing well this morning. She is tolerating a regular diet without nausea or vomiting. She is voiding spontaneously. She is ambulating. She has passed flatus, and has not a BM. Vaginal bleeding is mild. She denies fever or chills. PT c/o lower abdominal pain and lower back pain that is moderate and controlled with oral medications. She is not breastfeeding. Male infant in NICU.    Objective:     Vital Signs (Most Recent):  Temp: 98.2 °F (36.8 °C) (17 0000)  Pulse: 66 (17 0000)  Resp: 16 (17 0000)  BP: 121/85 (17 0000)  SpO2: 97 % (17 0000) Vital Signs (24h Range):  Temp:  [98.1 °F (36.7 °C)-98.2 °F (36.8 °C)] 98.2 °F (36.8 °C)  Pulse:  [63-93] 66  Resp:  [16-18] 16  SpO2:  [95 %-100 %] 97 %  BP: (104-144)/(59-97) 121/85     Weight: 49 kg (108 lb)  Body mass index is 18.54 kg/m².      Intake/Output Summary (Last 24 hours) at 17 0653  Last data filed at 17 1300   Gross per 24 hour   Intake                0 ml   Output              225 ml   Net             -225 ml       Significant Labs:  Lab Results   Component Value Date    GROUPTRH A POS 2017    HEPBSAG Negative 2017       Recent Labs  Lab 17  0452   HGB 11.9*   HCT 35.5*       I have personallly reviewed all pertinent lab results from the last 24 hours.    Physical Exam:   Constitutional: She is oriented to person, place, and time. She appears well-developed and well-nourished. No distress.    HENT:   Head: Normocephalic and atraumatic.    Eyes: Conjunctivae are normal.    Neck: Normal range of motion.     Cardiovascular: Normal rate, regular rhythm, normal heart sounds and intact distal pulses.     Pulmonary/Chest: Effort normal and breath sounds normal.        Abdominal: Soft. Bowel sounds are normal. She exhibits no distension. There is tenderness (to deep palpation of suprapubic area,). There is no rebound and no guarding.             Musculoskeletal: Normal range of motion and moves all extremeties.       Neurological: She is alert and oriented to person, place, and time.    Skin: Skin is warm and dry. She is not diaphoretic.    Psychiatric: She has a normal mood and affect. Her behavior is normal. Judgment and thought content normal.

## 2017-07-31 NOTE — PROGRESS NOTES
[facility]  Obstetrics  Postpartum Progress Note    Patient Name: Raya Kent  MRN: [id number]  Admission Date: 2017  Hospital Length of Stay: 2 days  Attending Physician: Jose Elias Monroe Jr., MD  Primary Care Provider: Primary Doctor No    Subjective:     Principal Problem: (spontaneous vaginal delivery)    Hospital course: 2017 - Pt was admitted to L&D for management of labor. BMZ x 1 given at [facility] prior to arrival.   2017 - PPD #1 s/p  of  male infant now in NICU. C/o low abdominal and low back pain. WBC pre 18.33 > post 18.05. Temp: [98.1 °F (36.7 °C)-98.2 °F (36.8 °C)] 98.2 °F (36.8 °C) . Pt otherwise doing well and meeting PP milestones.      Interval History:     She is doing well this morning. She is tolerating a regular diet without nausea or vomiting. She is voiding spontaneously. She is ambulating. She has passed flatus, and has not a BM. Vaginal bleeding is mild. She denies fever or chills. PT c/o lower abdominal pain and lower back pain that is moderate and controlled with oral medications. She is not breastfeeding. Male infant in NICU.    Objective:     Vital Signs (Most Recent):  Temp: 98.2 °F (36.8 °C) (17 0000)  Pulse: 66 (17 0000)  Resp: 16 (17 0000)  BP: 121/85 (17 0000)  SpO2: 97 % (17 0000) Vital Signs (24h Range):  Temp:  [98.1 °F (36.7 °C)-98.2 °F (36.8 °C)] 98.2 °F (36.8 °C)  Pulse:  [63-93] 66  Resp:  [16-18] 16  SpO2:  [95 %-100 %] 97 %  BP: (104-144)/(59-97) 121/85     Weight: 49 kg (108 lb)  Body mass index is 18.54 kg/m².      Intake/Output Summary (Last 24 hours) at 17 0653  Last data filed at 17 1300   Gross per 24 hour   Intake                0 ml   Output              225 ml   Net             -225 ml       Significant Labs:  Lab Results   Component Value Date    GROUPTRH A POS 2017    HEPBSAG Negative 2017       Recent Labs  Lab 17  0452   HGB 11.9*   HCT 35.5*       I have  personallly reviewed all pertinent lab results from the last 24 hours.    Physical Exam:   Constitutional: She is oriented to person, place, and time. She appears well-developed and well-nourished. No distress.    HENT:   Head: Normocephalic and atraumatic.    Eyes: Conjunctivae are normal.    Neck: Normal range of motion.    Cardiovascular: Normal rate, regular rhythm, normal heart sounds and intact distal pulses.     Pulmonary/Chest: Effort normal and breath sounds normal.        Abdominal: Soft. Bowel sounds are normal. She exhibits no distension. There is tenderness (to deep palpation of suprapubic area,). There is no rebound and no guarding.             Musculoskeletal: Normal range of motion and moves all extremeties.       Neurological: She is alert and oriented to person, place, and time.    Skin: Skin is warm and dry. She is not diaphoretic.    Psychiatric: She has a normal mood and affect. Her behavior is normal. Judgment and thought content normal.       Assessment/Plan:     22 y.o. female  for:    Maternal tobacco use in third trimester    Pt admits to smoking cigarettes throughout pregnancy.  Social work consult ordered.          *  (spontaneous vaginal delivery)    Postpartum care:  - Patient doing well. Continue routine management and advances.  - Continue PO pain meds. Pain well controlled.  - Encourage ambulation.   - Heme: Pre Delivery h/h 13/39 --> Post Delivery h/h 12/35  - Leukocytosis - Predelivery WBC 18.33 --> Postdelivery 18.05  - Male infant in NICU  - Contraception - ASK  - Lactation - The patient is bottle feeding. Lactation nurse following along PRN  - Rh Status - POS            Disposition: As patient meets milestones, will plan to discharge on PPD #2.    John Sousa MD  Obstetrics  [facility]

## 2017-08-01 VITALS
TEMPERATURE: 98 F | HEIGHT: 64 IN | BODY MASS INDEX: 18.44 KG/M2 | OXYGEN SATURATION: 98 % | RESPIRATION RATE: 18 BRPM | SYSTOLIC BLOOD PRESSURE: 112 MMHG | WEIGHT: 108 LBS | HEART RATE: 54 BPM | DIASTOLIC BLOOD PRESSURE: 70 MMHG

## 2017-08-01 LAB
ALLENS TEST: ABNORMAL
HCO3 UR-SCNC: 23.6 MMOL/L (ref 24–28)
PCO2 BLDA: 42.1 MMHG (ref 35–45)
PH SMN: 7.36 [PH] (ref 7.35–7.45)
PO2 BLDA: 19 MMHG (ref 80–100)
POC BE: -2 MMOL/L
POC SATURATED O2: 28 % (ref 95–100)
SAMPLE: ABNORMAL
SITE: ABNORMAL

## 2017-08-01 PROCEDURE — 99238 HOSP IP/OBS DSCHRG MGMT 30/<: CPT | Mod: ,,, | Performed by: OBSTETRICS & GYNECOLOGY

## 2017-08-01 PROCEDURE — 25000003 PHARM REV CODE 250: Performed by: STUDENT IN AN ORGANIZED HEALTH CARE EDUCATION/TRAINING PROGRAM

## 2017-08-01 RX ORDER — IBUPROFEN 200 MG
1 TABLET ORAL DAILY
Refills: 0 | COMMUNITY
Start: 2017-08-01 | End: 2017-08-16

## 2017-08-01 RX ADMIN — OXYCODONE HYDROCHLORIDE AND ACETAMINOPHEN 1 TABLET: 5; 325 TABLET ORAL at 11:08

## 2017-08-01 RX ADMIN — OXYCODONE HYDROCHLORIDE AND ACETAMINOPHEN 1 TABLET: 5; 325 TABLET ORAL at 07:08

## 2017-08-01 RX ADMIN — OXYCODONE HYDROCHLORIDE AND ACETAMINOPHEN 1 TABLET: 5; 325 TABLET ORAL at 12:08

## 2017-08-01 RX ADMIN — IBUPROFEN 600 MG: 600 TABLET, FILM COATED ORAL at 12:08

## 2017-08-01 RX ADMIN — IBUPROFEN 600 MG: 600 TABLET, FILM COATED ORAL at 07:08
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
## 2017-08-01 NOTE — ASSESSMENT & PLAN NOTE
Postpartum care:  - Patient doing well. Continue routine management and advances.  - Continue PO pain meds. Pain well controlled with Percocet.  - Encourage ambulation.   - Heme: Pre Delivery h/h 13/39 --> Post Delivery h/h 12/35  - Leukocytosis - Predelivery WBC 18.33 --> Postdelivery 18.05  - Male infant in NICU  - Contraception - does not desire.  - Lactation - The patient is bottle feeding. Lactation nurse following along PRN  - Rh Status - POS

## 2017-08-01 NOTE — DISCHARGE SUMMARY
[facility]  Obstetrics  Discharge Summary      Patient Name: Raya Kent  MRN: [id number]  Admission Date: 2017  Hospital Length of Stay: 3 days  Discharge Date and Time:  2017 6:22 AM  Attending Physician: Jose Elias Monroe Jr., MD   Discharging Provider: Sandra Chang MD  Primary Care Provider: Primary Doctor No    HPI: Pt was transferred via EMS from [place] for contractions and a dilated cervix. Cervix there was found to be 4 cm dilated. She received one dose of BMZ at the outside hospital. On arrival in the ED cervix was 6/90/-2 with a bulging bag. She was moved to the Labor and Delivery unit, vertex presentation was confirmed, and consents were signed. Pt desires an epidural now.     Pt has a history of Abnormal quad screen - increased risk for NTD followed up with u/s showing normal fetal anatomy and appropriate growth at 19+3. She smoked cigarettes during the pregnancy and took hydrocodone intermittently for dental pain.       * No surgery found *     Hospital Course:   2017 - Pt was admitted to L&D for management of labor. BMZ x 1 given at [facility] prior to arrival.   2017 - PPD #1 s/p  of  male infant now in NICU. C/o low abdominal and low back pain. WBC pre 18.33 > post 18.05. Temp: [98.1 °F (36.7 °C)-98.2 °F (36.8 °C)] 98.2 °F (36.8 °C) . Pt otherwise doing well and meeting PP milestones.  2017 - PPD #2 s/p . Still c/o lower abdominal and lower back pain. VSS. Pt otherwise doing well and has met all PP milestones.      Consults         Status Ordering Provider     Inpatient consult to Anesthesiology  Once     Provider:  (Not yet assigned)    Acknowledged CHERYL VIVAR     Inpatient consult to Social Work  Once     Provider:  (Not yet assigned)    Acknowledged SANDRA CHANG          Final Active Diagnoses:    Diagnosis Date Noted POA    PRINCIPAL PROBLEM:   (spontaneous vaginal delivery) [O80] 2017 Not Applicable    Maternal tobacco use  in third trimester [O99.333] 2017 Unknown     Chronic      Problems Resolved During this Admission:    Diagnosis Date Noted Date Resolved POA     labor [O60.00] 2017 Yes     labor [O60.00] 2017 Yes     labor [O60.00] 2017 Unknown    Vaginal bleeding in pregnancy [O46.90] 2017 Yes        Labs:   CMP No results for input(s): NA, K, CL, CO2, GLU, BUN, CREATININE, CALCIUM, PROT, ALBUMIN, BILITOT, ALKPHOS, AST, ALT, ANIONGAP, ESTGFRAFRICA, EGFRNONAA in the last 48 hours. and CBC   Recent Labs  Lab 17  0452   WBC 18.05*   HGB 11.9*   HCT 35.5*          Feeding Method: breast    Immunizations     None          Delivery:    Episiotomy: None   Lacerations: None   Repair suture: None   Repair # of packets:     Blood loss (ml): 100     Birth information:  YOB: 2015   Time of birth: 6:24 AM   Sex: male   Delivery type: Vaginal, Spontaneous Delivery   Gestational Age: 32w6d    Delivery Clinician:      Other providers:       Additional  information:  Forceps:    Vacuum:    Breech:    Observed anomalies      Living?:           APGARS  One minute Five minutes Ten minutes   Skin color:         Heart rate:         Grimace:         Muscle tone:         Breathing:         Totals: 8  8        Placenta: Delivered:       appearance    Pending Diagnostic Studies:     None          Discharged Condition: fair    Disposition: Home or Self Care    Follow Up:  Follow-up Information     Michael A Wiedemann, MD. Schedule an appointment as soon as possible for a visit in 6 weeks.    Specialties:  Obstetrics, Obstetrics and Gynecology  Why:  for postpartum checkup  Contact information:  [address]  [place] [address]  [phone]                 Patient Instructions:     Diet general     Activity as tolerated     Other restrictions (specify):   Order Comments: Nothing in the vagina for 6 weeks.     Call MD  for:  temperature >100.4     Call MD for:  persistent nausea and vomiting or diarrhea     Call MD for:  severe uncontrolled pain     Call MD for:  redness, tenderness, or signs of infection (pain, swelling, redness, odor or green/yellow discharge around incision site)     Call MD for:  difficulty breathing or increased cough     Call MD for:  severe persistent headache     Call MD for:  worsening rash     Call MD for:  persistent dizziness, light-headedness, or visual disturbances     Call MD for:  increased confusion or weakness     Call MD for:   Order Comments: Vaginal bleeding heavier than soaking through more than 1 pad per hour.       Medications:  Current Discharge Medication List      START taking these medications    Details   ibuprofen (ADVIL,MOTRIN) 600 MG tablet Take 1 tablet (600 mg total) by mouth every 6 (six) hours.  Qty: 45 tablet, Refills: 1      nicotine (NICODERM CQ) 21 mg/24 hr Place 1 patch onto the skin once daily.  Refills: 0             John Sousa MD  Obstetrics  [facility]    Doing well, no complaints other than PP_ discomfort., ready for D/C.

## 2017-08-01 NOTE — PLAN OF CARE
Social work consulted to see pt: THC use in pregnancy.    Pt delivered a 32wk premie infant on 2017.  Sw met with pt to complete NICU assessment and to discuss substance abuse during pregnancy.  Pt admitted to smoking THC close to the time of the delivery.  She denies frequent use.  Pt advised that her  would have toxicology testing and depending on the results, DCFS could become involved.  Pt and SO/FOB voiced understanding.    COPIED FROM INFANT'S CHART    SOCIAL WORK DISCHARGE PLANNING ASSESSMENT    Sw completed discharge planning assessment with pt's parents at pt's bedside.  Pt's parents were easily engaged. Education on the role of  was provided. Emotional support provided throughout assessment.      Legal Name: Avila Marte  :  2015    Patient Active Problem List   Diagnosis    Prematurity, 1,500-1,749 grams, 31-32 completed weeks    Need for observation and evaluation of  for sepsis     hyperbilirubinemia    Pulmonary insufficiency         [facility]   CAMERON: 2015    Birth Weight: 1.72 kg (3 lb 12.7 oz)  Birth Length: 42.0cm  Gestational Age: 32w6d           Assessment    Living status:  Living  Apgars:     1 Minute:   5 Minute:   10 Minute:   15 Minute:   20 Minute:     Skin Color:   1  1       Heart Rate:   2  2       Reflex Irritability:   2  2       Muscle Tone:   1  1       Respiratory Effort:   2  2       Total:   8  8               Apgars Assigned By:  NICU         Mother: Raya Kent, age 20,  1997  Address: [address] [place] [address]. [place] [address]  Phone: [phone]  Employer: none    Job Title: disabled  Education: some high school       Father: Saul Marte, age 25,  3/23/1992  Address: [address] [place] [address]. [address] [place] [address]  Phone: [phone]  Employer: self-employed   Job Title: build swimming [employer];    Education:  some high school  Signed Birth Certificate: Yes;  "parents are involved in a relationship    Support person(s): Bird Rosanna ([facility]) [phone];  Marta Rosanna ([facility]) [phone]    Sibling(s): Saul Valencia - age 4 (paternal sibling)    Spiritual Affiliation: None      Commercial Insurance Coverage: No        [facility] Health Plan (formerly [place] Medicaid): Primary: Yes Secondary: No   [place] [facility]     Pediatrician: Prefers [facility].  List provided.  Mom to select MD and inform bedside RN      Nutrition: Formula    Breast Pump:   N/A        WIC:   Mom not certified; however will apply for        Essential Items: (includes car seat, crib/bassinet/pack-n-play, clothing, bottles, diapers, etc.)  Acquired     Transportation: Family/friends     Education: Information given on CPR classes and Physician/NNP daily rounds.     Potential Eligibility for SSI Benefits: No    Potential Discharge Needs:  None     Substance Abuse Hx:  Pt reports that she smoked marijuana once during the pregnancy near the time that she delivered.  She reports that her "nerves are bad" and that she needed to eat.  Pt reports that she has an eating disorder, however, could not provide the exact dx.  She also reports that her OB/GYN encouraged her to smoke marijuana.  Sw discussed THC use with FOB.  He voiced that he does smoke THC.  Sw explained to both that pt will have a toxicology test and if the results are positive, then a report to DCFS will be made.  Both voiced understanding.      Sw also discussed the importance of smoking cessation and also not smoking prior to visiting with pt.  Parents voiced understanding.    Sw will continue to follow.       17 1142   Discharge Assessment   Assessment Type Discharge Planning Assessment   Confirmed/corrected address and phone number on facesheet? Yes   Assessment information obtained from? Caregiver   Communicated expected length of stay with patient/caregiver yes   Current Functional Status: Infant/Toddler/Child " Appropriate   Lives With parent(s)   Discharge Plan A Home with family       Ashley Sloan LCSW  NICU   Ext. [phone]-phone  [email or web address]

## 2017-08-01 NOTE — SUBJECTIVE & OBJECTIVE
Hospital course: 2017 - Pt was admitted to L&D for management of labor. BMZ x 1 given at [facility] prior to arrival.   2017 - PPD #1 s/p  of  male infant now in NICU. C/o low abdominal and low back pain. WBC pre 18.33 > post 18.05. Temp: [98.1 °F (36.7 °C)-98.2 °F (36.8 °C)] 98.2 °F (36.8 °C) . Pt otherwise doing well and meeting PP milestones.  2017 - PPD #2 s/p . Still c/o lower abdominal and lower back pain. VSS. Pt otherwise doing well and has met all PP milestones.      Interval History:     She is doing well this morning. She is tolerating a regular diet without nausea or vomiting. She is voiding spontaneously. She is ambulating. She has passed flatus, and has not a BM. Vaginal bleeding is mild. She denies fever or chills. Pt continues to c/o lower abdominal pain and lower back pain that is moderate and controlled with oral medications, however she has been requesting the Percocet 10/325 and says she cannot go home in this much pain, as she will be right back in the hospital.  She is not breastfeeding. Male infant in NICU.    Objective:     Vital Signs (Most Recent):  Temp: 98.6 °F (37 °C) (17 0026)  Pulse: 80 (17 0026)  Resp: 18 (17 0026)  BP: 120/65 (17 0026)  SpO2: 99 % (17 0026) Vital Signs (24h Range):  Temp:  [98.2 °F (36.8 °C)-98.6 °F (37 °C)] 98.6 °F (37 °C)  Pulse:  [70-80] 80  Resp:  [18] 18  SpO2:  [99 %] 99 %  BP: (104-120)/(65-81) 120/65     Weight: 49 kg (108 lb)  Body mass index is 18.54 kg/m².    No intake or output data in the 24 hours ending 17 0611    Significant Labs:  Lab Results   Component Value Date    GROUPTRH A POS 2017    HEPBSAG Negative 2017       Recent Labs  Lab 17  0452   HGB 11.9*   HCT 35.5*       I have personallly reviewed all pertinent lab results from the last 24 hours.    Physical Exam:   Constitutional: She is oriented to person, place, and time. She appears well-developed and well-nourished.  No distress.    HENT:   Head: Normocephalic and atraumatic.    Eyes: Conjunctivae are normal.    Neck: Normal range of motion.    Cardiovascular: Normal rate, regular rhythm, normal heart sounds and intact distal pulses.     Pulmonary/Chest: Effort normal and breath sounds normal.        Abdominal: Soft. Bowel sounds are normal. She exhibits no distension. There is tenderness (to palpation of lower back and suprapubic area. No fundal tenderness.). There is no rebound and no guarding.     Genitourinary:   Genitourinary Comments: No CVA tenderness           Musculoskeletal: Normal range of motion and moves all extremeties.       Neurological: She is alert and oriented to person, place, and time.    Skin: Skin is warm and dry. She is not diaphoretic.    Psychiatric: She has a normal mood and affect. Her behavior is normal. Judgment and thought content normal.

## 2017-08-01 NOTE — PROGRESS NOTES
Pt complains of burning with urination and lower back pain. Dr. Hinson notified and said for pt to call MD if burning continues. Pt requesting prescription for percocet to go home with. MD said no and gave prescription for Ibuprofen only.

## 2017-08-01 NOTE — PROGRESS NOTES
[facility]  Obstetrics  Postpartum Progress Note    Patient Name: Raya Kent  MRN: [id number]  Admission Date: 2017  Hospital Length of Stay: 3 days  Attending Physician: Jose Elias Monroe Jr., MD  Primary Care Provider: Primary Doctor No    Subjective:     Principal Problem: (spontaneous vaginal delivery)    Hospital course: 2017 - Pt was admitted to L&D for management of labor. BMZ x 1 given at [facility] prior to arrival.   2017 - PPD #1 s/p  of  male infant now in NICU. C/o low abdominal and low back pain. WBC pre 18.33 > post 18.05. Temp: [98.1 °F (36.7 °C)-98.2 °F (36.8 °C)] 98.2 °F (36.8 °C) . Pt otherwise doing well and meeting PP milestones.  2017 - PPD #2 s/p . Still c/o lower abdominal and lower back pain. VSS. Pt otherwise doing well and has met all PP milestones.      Interval History:     She is doing well this morning. She is tolerating a regular diet without nausea or vomiting. She is voiding spontaneously. She is ambulating. She has passed flatus, and has not a BM. Vaginal bleeding is mild. She denies fever or chills. Pt continues to c/o lower abdominal pain and lower back pain that is moderate and controlled with oral medications, however she has been requesting the Percocet 10/325 and says she cannot go home in this much pain, as she will be right back in the hospital.  She is not breastfeeding. Male infant in NICU.    Objective:     Vital Signs (Most Recent):  Temp: 98.6 °F (37 °C) (17)  Pulse: 80 (17)  Resp: 18 (17)  BP: 120/65 (17)  SpO2: 99 % (17) Vital Signs (24h Range):  Temp:  [98.2 °F (36.8 °C)-98.6 °F (37 °C)] 98.6 °F (37 °C)  Pulse:  [70-80] 80  Resp:  [18] 18  SpO2:  [99 %] 99 %  BP: (104-120)/(65-81) 120/65     Weight: 49 kg (108 lb)  Body mass index is 18.54 kg/m².    No intake or output data in the 24 hours ending 17 0611    Significant Labs:  Lab Results   Component Value  Date    GROUPTRH A POS 2017    HEPBSAG Negative 2017       Recent Labs  Lab 17  0452   HGB 11.9*   HCT 35.5*       I have personallly reviewed all pertinent lab results from the last 24 hours.    Physical Exam:   Constitutional: She is oriented to person, place, and time. She appears well-developed and well-nourished. No distress.    HENT:   Head: Normocephalic and atraumatic.    Eyes: Conjunctivae are normal.    Neck: Normal range of motion.    Cardiovascular: Normal rate, regular rhythm, normal heart sounds and intact distal pulses.     Pulmonary/Chest: Effort normal and breath sounds normal.        Abdominal: Soft. Bowel sounds are normal. She exhibits no distension. There is tenderness (to palpation of lower back and suprapubic area. No fundal tenderness.). There is no rebound and no guarding.     Genitourinary:   Genitourinary Comments: No CVA tenderness           Musculoskeletal: Normal range of motion and moves all extremeties.       Neurological: She is alert and oriented to person, place, and time.    Skin: Skin is warm and dry. She is not diaphoretic.    Psychiatric: She has a normal mood and affect. Her behavior is normal. Judgment and thought content normal.       Assessment/Plan:     22 y.o. female  for:    Maternal tobacco use in third trimester    Pt admits to smoking cigarettes throughout pregnancy.  Social work consult ordered.  Pt had requested nicotine patch but then refused it per nursing.  She requested a prescription for home.          *  (spontaneous vaginal delivery)    Postpartum care:  - Patient doing well. Continue routine management and advances.  - Continue PO pain meds. Pain well controlled with Percocet.  - Encourage ambulation.   - Heme: Pre Delivery h/h  --> Post Delivery h/h   - Leukocytosis - Predelivery WBC 18.33 --> Postdelivery 18.05  - Male infant in NICU  - Contraception - does not desire.  - Lactation - The patient is bottle feeding.  Lactation nurse following along PRN  - Rh Status - POS          Pt expresses anxiety about going home without narcotic pain medication. However, she had no vaginal, labial, or perineal abrasions or lacerations, has no signs or symptoms of infection. The pain she describes is that of uterine cramping and is appropriate for PPD #1 and #2.  Per nursing, she left campus to go to a restaurant last night, and then again left before going to the NICU.  The patient was counseled extensively about pain control using ibuprofen and warm compresses.    Disposition: As patient has met all milestones, will plan to discharge today.    John Sousa MD  Obstetrics  [facility]    Doing well, no questions,no problems other than pain, appears sedated..  I have reviewed the resident's note, evaluated the patient and agree with the diagnosis and management plan

## 2017-08-01 NOTE — PROGRESS NOTES
Mom given names of female ob/gyn doctors here at [facility] mom states only saw Dr Wiedemann occasionally while pregnant and does not want to go back

## 2017-08-01 NOTE — PLAN OF CARE
Problem: Patient Care Overview  Goal: Plan of Care Review  Outcome: Outcome(s) achieved Date Met: 08/01/17  Pt. discharged home, vitals stable. Pt voiding and ambulating without difficulty.

## 2017-08-01 NOTE — ASSESSMENT & PLAN NOTE
Pt admits to smoking cigarettes throughout pregnancy.  Social work consult ordered.  Pt had requested nicotine patch but then refused it per nursing.  She requested a prescription for home.

## 2017-08-01 NOTE — PLAN OF CARE
Problem: Patient Care Overview  Goal: Plan of Care Review  Vss, pt ambulating, visiting infant in nicu, pain controlled with po pain meds

## 2017-08-03 NOTE — ADDENDUM NOTE
Addendum  created 08/03/17 1417 by Kacey Rivera MD    Anesthesia Intra Blocks edited, Delete clinical note, Order Canceled from Note

## 2017-08-03 NOTE — ANESTHESIA POSTPROCEDURE EVALUATION
Anesthesia Post Evaluation    Patient: Raya Yoli    Procedure(s) Performed: * No procedures listed *    Final Anesthesia Type: epidural  Patient location during evaluation: labor & delivery  Patient participation: Yes- Able to Participate  Level of consciousness: awake and alert and oriented  Post-procedure vital signs: reviewed and stable  Pain management: adequate  Airway patency: patent  PONV status at discharge: No PONV  Anesthetic complications: no      Cardiovascular status: blood pressure returned to baseline and hemodynamically stable  Respiratory status: unassisted, spontaneous ventilation and room air  Hydration status: euvolemic  Follow-up not needed.        Visit Vitals  LMP  (LMP Unknown)       Pain/Davin Score: No Data Recorded

## 2017-08-16 ENCOUNTER — HOSPITAL ENCOUNTER (EMERGENCY)
Facility: HOSPITAL | Age: 22
Discharge: HOME | End: 2017-08-16
Attending: EMERGENCY MEDICINE
Payer: MEDICAID

## 2017-08-16 VITALS
RESPIRATION RATE: 17 BRPM | OXYGEN SATURATION: 100 % | WEIGHT: 108 LBS | SYSTOLIC BLOOD PRESSURE: 109 MMHG | BODY MASS INDEX: 18.44 KG/M2 | HEART RATE: 88 BPM | DIASTOLIC BLOOD PRESSURE: 65 MMHG | HEIGHT: 64 IN | TEMPERATURE: 99 F

## 2017-08-16 DIAGNOSIS — K64.5 HEMORRHOID THROMBOSIS: Primary | ICD-10-CM

## 2017-08-16 PROCEDURE — 25000003 PHARM REV CODE 250: Performed by: NURSE PRACTITIONER

## 2017-08-16 PROCEDURE — 99283 EMERGENCY DEPT VISIT LOW MDM: CPT

## 2017-08-16 PROCEDURE — 25000003 PHARM REV CODE 250: Performed by: EMERGENCY MEDICINE

## 2017-08-16 RX ORDER — LIDOCAINE HYDROCHLORIDE 20 MG/ML
5 JELLY TOPICAL
Status: DISCONTINUED | OUTPATIENT
Start: 2017-08-16 | End: 2017-08-16

## 2017-08-16 RX ORDER — HYDROCORTISONE 25 MG/G
CREAM TOPICAL 2 TIMES DAILY
Qty: 1 TUBE | Refills: 0 | Status: SHIPPED | OUTPATIENT
Start: 2017-08-16 | End: 2017-08-26

## 2017-08-16 RX ORDER — DOCUSATE SODIUM 100 MG/1
100 CAPSULE, LIQUID FILLED ORAL 2 TIMES DAILY
Qty: 60 CAPSULE | Refills: 0 | COMMUNITY
Start: 2017-08-16 | End: 2017-08-16

## 2017-08-16 RX ORDER — OXYCODONE AND ACETAMINOPHEN 5; 325 MG/1; MG/1
1 TABLET ORAL EVERY 6 HOURS PRN
Qty: 10 TABLET | Refills: 0 | Status: SHIPPED | OUTPATIENT
Start: 2017-08-16 | End: 2018-03-15

## 2017-08-16 RX ORDER — OXYCODONE AND ACETAMINOPHEN 5; 325 MG/1; MG/1
1 TABLET ORAL
Status: COMPLETED | OUTPATIENT
Start: 2017-08-16 | End: 2017-08-16

## 2017-08-16 RX ORDER — LIDOCAINE HYDROCHLORIDE 20 MG/ML
JELLY TOPICAL
Status: COMPLETED | OUTPATIENT
Start: 2017-08-16 | End: 2017-08-16

## 2017-08-16 RX ORDER — DOCUSATE SODIUM 100 MG/1
100 CAPSULE, LIQUID FILLED ORAL 2 TIMES DAILY
Qty: 60 CAPSULE | Refills: 0 | Status: SHIPPED | OUTPATIENT
Start: 2017-08-16 | End: 2018-03-15

## 2017-08-16 RX ADMIN — LIDOCAINE HYDROCHLORIDE 5 ML: 20 JELLY TOPICAL at 05:08

## 2017-08-16 RX ADMIN — OXYCODONE AND ACETAMINOPHEN 1 TABLET: 5; 325 TABLET ORAL at 05:08

## 2017-08-16 NOTE — ED PROVIDER NOTES
"Encounter Date: 8/16/2017       History     Chief Complaint   Patient presents with    Hemorrhoids     reports hemmorhoids x 2 years worse the last week after giving birth on 7/30/2017     22-year-old female presents to the ED for evaluation of hemorrhoids.  The patient reports she has had hemorrhoids for the past 2 years, after giving birth on 7/30/2017 they are worse.  She reports that her stools have been "hard."  No bright red blood per rectum.  She reports she has been drinking normal amount of fluids, but she has not been eating very well.  No fever or chills.  No vomiting, or abdominal pain.  She reports that usually she can push the hemorrhoid back in while taking a hot shower, but she is unable to do so due to pain at this time.  She reports the pain is worse with sitting and walking.  Nothing seems to help.  Pt reports she is pumping but does not give the milk to her baby as he is currently in NICU.       The history is provided by the patient and the spouse.   Rectal Bleeding    The current episode started several weeks ago. The onset was gradual. The problem occurs continuously. The problem has been gradually worsening. The pain is at a severity of 10/10. The stool is described as hard. There was no prior successful therapy. There was no prior unsuccessful therapy. Associated symptoms include anorexia, hemorrhoids, rectal pain and vaginal bleeding. Pertinent negatives include no fever, no abdominal pain, no diarrhea, no vomiting, no vaginal discharge, no coughing and no rash. She has been eating less than usual. Urine output has been normal. The last void occurred less than 6 hours ago. There were sick contacts none. Recently, medical care has been given by a specialist. Services performed: vaginal delivery.     Review of patient's allergies indicates:   Allergen Reactions    Sulfa (sulfonamide antibiotics)     Sulfa (sulfonamide antibiotics) Hives     Past Medical History:   Diagnosis Date    Back " pain     Depression     H/O psychiatric hospitalization      Past Surgical History:   Procedure Laterality Date    INNER EAR SURGERY      TYMPANOPLASTY       Family History   Problem Relation Age of Onset    Hypertension Father     Diabetes Father     Hypertension Mother     Diabetes Mother      Social History   Substance Use Topics    Smoking status: Former Smoker     Types: Cigarettes    Smokeless tobacco: Never Used    Alcohol use No     Review of Systems   Constitutional: Positive for appetite change. Negative for chills and fever.   HENT: Negative for congestion.    Respiratory: Negative for cough.    Gastrointestinal: Positive for anorexia, constipation, hematochezia, hemorrhoids and rectal pain. Negative for abdominal pain, blood in stool, diarrhea and vomiting.   Genitourinary: Positive for vaginal bleeding. Negative for dysuria and vaginal discharge.   Skin: Negative for rash.   Allergic/Immunologic: Negative for immunocompromised state.   Psychiatric/Behavioral: Negative for confusion.       Physical Exam     Initial Vitals [08/16/17 1559]   BP Pulse Resp Temp SpO2   109/65 88 17 98.7 °F (37.1 °C) 100 %      MAP       79.67         Physical Exam    Nursing note and vitals reviewed.  Constitutional: Vital signs are normal. She appears well-developed and well-nourished. She is active and cooperative. She is easily aroused.  Non-toxic appearance. She does not have a sickly appearance. She does not appear ill. She appears distressed (due to pain).   HENT:   Head: Normocephalic and atraumatic.   Eyes: Conjunctivae and EOM are normal.   Neck: Normal range of motion. Neck supple.   Cardiovascular: Normal rate.   Pulmonary/Chest: Effort normal.   Abdominal: Soft. Normal appearance. She exhibits no distension. There is no tenderness. There is no rigidity, no rebound and no guarding.   Genitourinary: Rectal exam shows external hemorrhoid (thrombossed) and tenderness. Rectal exam shows no internal  "hemorrhoid, no fissure, no mass, anal tone normal and guaiac negative stool. Guaiac negative stool.   Genitourinary Comments: 2cm diameter thrombosed external hemorrhoid.  No bleeding.     Neurological: She is alert, oriented to person, place, and time and easily aroused. She has normal strength. GCS eye subscore is 4. GCS verbal subscore is 5. GCS motor subscore is 6.   Skin: Skin is warm, dry and intact. No bruising and no rash noted. No erythema.   Psychiatric: She has a normal mood and affect. Her speech is normal and behavior is normal. Judgment and thought content normal. Cognition and memory are normal.         ED Course   Procedures  Labs Reviewed - No data to display          Medical Decision Making:   Initial Assessment:   23yo female here for rectal pain.  The patient has had hemorrhoids for over 2 years, reports it has been getting worse after giving birth on 7/30/2017.  No rectal bleeding, abdominal pain, vomiting.  Her last bowel movement was yesterday which was small and hard.  She is currently experiencing "on-and-off" vaginal bleeding.  No fever or chills.  The patient is pumping her breast milk, but not giving it to her baby.  The patient appears to be in pain.  Vital stable.  Nontoxic.  Abdomen soft, nontender, no rebound, rigidity, or guarding.  There is a tender thrombosed external hemorrhoid.  No bleeding.  Differential Diagnosis:   Hemorrhoid, rectal pain, fissure  ED Management:  Exam, consult Ob/GYN, Topical lidocaine  Other:   I have discussed this case with another health care provider.       <> Summary of the Discussion:  (8439)- refer to general/colorectal surgery.    There is no indication for labs or imaging at this time.  Pt will be referred to general surgery and advised to follow up with her OB tomorrow.  RX Anusol, Colace, and Percocet.  Advised increased fluid intake and pumping and dumping while using Percocet.  Return to the ED if condition changes, progresses, or if " there are concerns.  Pt verbalized understanding, compliance, and agreement with the treatment plan.                      ED Course     Clinical Impression:   The encounter diagnosis was Hemorrhoid thrombosis.                           Chantal Ferreira, NEMESIO  08/16/17 [id number]

## 2017-08-16 NOTE — DISCHARGE INSTRUCTIONS
Pump and dump while taking Percocet.  Follow up with  and your OB doctor.  Call tomorrow.  Return to the ED if condition changes, progresses, or if you have any concerns.

## 2018-03-15 ENCOUNTER — HOSPITAL ENCOUNTER (EMERGENCY)
Facility: HOSPITAL | Age: 23
Discharge: HOME | End: 2018-03-15
Attending: EMERGENCY MEDICINE

## 2018-03-15 VITALS
HEART RATE: 98 BPM | OXYGEN SATURATION: 100 % | DIASTOLIC BLOOD PRESSURE: 53 MMHG | HEIGHT: 64 IN | BODY MASS INDEX: 18.44 KG/M2 | TEMPERATURE: 98 F | SYSTOLIC BLOOD PRESSURE: 97 MMHG | WEIGHT: 108 LBS | RESPIRATION RATE: 20 BRPM
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
## 2018-03-15 DIAGNOSIS — S20.212A RIB CONTUSION, LEFT, INITIAL ENCOUNTER: Primary | ICD-10-CM

## 2018-03-15 DIAGNOSIS — M54.50 LUMBAR PAIN: ICD-10-CM

## 2018-03-15 LAB
B-HCG UR QL: NEGATIVE
CTP QC/QA: YES

## 2018-03-15 PROCEDURE — 99284 EMERGENCY DEPT VISIT MOD MDM: CPT

## 2018-03-15 PROCEDURE — 25000003 PHARM REV CODE 250: Performed by: EMERGENCY MEDICINE

## 2018-03-15 PROCEDURE — 81025 URINE PREGNANCY TEST: CPT | Performed by: EMERGENCY MEDICINE

## 2018-03-15 RX ORDER — ONDANSETRON 4 MG/1
4 TABLET, ORALLY DISINTEGRATING ORAL
Status: COMPLETED | OUTPATIENT
Start: 2018-03-15 | End: 2018-03-15

## 2018-03-15 RX ORDER — OXYCODONE AND ACETAMINOPHEN 7.5; 325 MG/1; MG/1
1 TABLET ORAL ONCE
Status: COMPLETED | OUTPATIENT
Start: 2018-03-15 | End: 2018-03-15

## 2018-03-15 RX ORDER — IBUPROFEN 600 MG/1
600 TABLET ORAL EVERY 6 HOURS PRN
Qty: 20 TABLET | Refills: 0 | Status: SHIPPED | OUTPATIENT
Start: 2018-03-15

## 2018-03-15 RX ORDER — ORPHENADRINE CITRATE 100 MG/1
100 TABLET, EXTENDED RELEASE ORAL 2 TIMES DAILY
Qty: 15 TABLET | Refills: 0 | Status: SHIPPED | OUTPATIENT
Start: 2018-03-15 | End: 2018-03-25

## 2018-03-15 RX ADMIN — ONDANSETRON 4 MG: 4 TABLET, ORALLY DISINTEGRATING ORAL at 03:03

## 2018-03-15 RX ADMIN — OXYCODONE HYDROCHLORIDE AND ACETAMINOPHEN 1 TABLET: 7.5; 325 TABLET ORAL at 03:03

## 2018-03-15 NOTE — ED NOTES
APPEARANCE: Alert, oriented and in no acute distress.  CARDIAC: Normal rate and rhythm. S1S2 noted.   PERIPHERAL VASCULAR: peripheral pulses present. Normal cap refill. No edema. Warm to touch. 2+ radial and pedal pulses.   RESPIRATORY:Normal rate and effort, breath sounds clear bilaterally throughout chest. Respirations are equal and unlabored no obvious signs of distress.  MUSC: Full ROM. No obvious deformity. Pt able to move her wrist and was able to walk to the ED room with no deficits. Pt states pain to the left thigh, the left flank, the left anterior lower ribs, and the right wrist.   SKIN: Skin is warm and dry, normal skin turgor, mucous membranes moist. Pt has some redness to the right lateral wrist.   NEURO: 5/5 strength major flexors/extensors bilaterally. Sensory intact to light touch bilaterally. [place] coma scale: eyes open spontaneously-4, oriented & converses-5, obeys commands-6. No neurological abnormalities.   MENTAL STATUS: awake, alert and aware of environment.  EYE: No obvious discharge.   ENT: EARS: no obvious drainage. NOSE: no active bleeding.

## 2018-03-15 NOTE — ED NOTES
Pt offered a pillow and an ice pack for comfort measures; pt reports same pain; urine requested and pt informed xrays are pending the urine preg test
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
## 2018-04-08 ENCOUNTER — HOSPITAL ENCOUNTER (EMERGENCY)
Facility: HOSPITAL | Age: 23
Discharge: HOME | End: 2018-04-08
Attending: EMERGENCY MEDICINE

## 2018-04-08 VITALS
TEMPERATURE: 98 F | WEIGHT: 112.19 LBS | HEIGHT: 64 IN | BODY MASS INDEX: 19.15 KG/M2 | RESPIRATION RATE: 16 BRPM | SYSTOLIC BLOOD PRESSURE: 128 MMHG | DIASTOLIC BLOOD PRESSURE: 74 MMHG | OXYGEN SATURATION: 98 % | HEART RATE: 64 BPM

## 2018-04-08 DIAGNOSIS — R10.10 PAIN OF UPPER ABDOMEN: ICD-10-CM

## 2018-04-08 DIAGNOSIS — K52.9 ENTERITIS: Primary | ICD-10-CM

## 2018-04-08 LAB
ABO + RH BLD: NORMAL
ALBUMIN SERPL BCP-MCNC: 4 G/DL
ALP SERPL-CCNC: 81 U/L
ALT SERPL W/O P-5'-P-CCNC: 10 U/L
ANION GAP SERPL CALC-SCNC: 10 MMOL/L
AST SERPL-CCNC: 13 U/L
B-HCG UR QL: NEGATIVE
BASOPHILS # BLD AUTO: 0.04 K/UL
BASOPHILS NFR BLD: 0.4 %
BILIRUB SERPL-MCNC: 0.2 MG/DL
BILIRUB UR QL STRIP: NEGATIVE
BLD GP AB SCN CELLS X3 SERPL QL: NORMAL
BUN SERPL-MCNC: 16 MG/DL
CALCIUM SERPL-MCNC: 9.7 MG/DL
CHLORIDE SERPL-SCNC: 106 MMOL/L
CLARITY UR: CLEAR
CO2 SERPL-SCNC: 25 MMOL/L
COLOR UR: YELLOW
CREAT SERPL-MCNC: 0.7 MG/DL
CTP QC/QA: YES
DIFFERENTIAL METHOD: ABNORMAL
EOSINOPHIL # BLD AUTO: 0.3 K/UL
EOSINOPHIL NFR BLD: 3 %
ERYTHROCYTE [DISTWIDTH] IN BLOOD BY AUTOMATED COUNT: 13.6 %
EST. GFR  (AFRICAN AMERICAN): >60 ML/MIN/1.73 M^2
EST. GFR  (NON AFRICAN AMERICAN): >60 ML/MIN/1.73 M^2
GLUCOSE SERPL-MCNC: 86 MG/DL
GLUCOSE UR QL STRIP: NEGATIVE
HCT VFR BLD AUTO: 41.3 %
HGB BLD-MCNC: 13.2 G/DL
HGB UR QL STRIP: NEGATIVE
INR PPP: 0.9
KETONES UR QL STRIP: NEGATIVE
LEUKOCYTE ESTERASE UR QL STRIP: NEGATIVE
LYMPHOCYTES # BLD AUTO: 2.7 K/UL
LYMPHOCYTES NFR BLD: 23.9 %
MCH RBC QN AUTO: 31.8 PG
MCHC RBC AUTO-ENTMCNC: 32 G/DL
MCV RBC AUTO: 100 FL
MONOCYTES # BLD AUTO: 0.4 K/UL
MONOCYTES NFR BLD: 3.9 %
NEUTROPHILS # BLD AUTO: 7.7 K/UL
NEUTROPHILS NFR BLD: 68.6 %
NITRITE UR QL STRIP: NEGATIVE
PH UR STRIP: 6 [PH] (ref 5–8)
PLATELET # BLD AUTO: 267 K/UL
PMV BLD AUTO: 9.9 FL
POTASSIUM SERPL-SCNC: 3.8 MMOL/L
PROT SERPL-MCNC: 7.5 G/DL
PROT UR QL STRIP: NEGATIVE
PROTHROMBIN TIME: 9.6 SEC
RBC # BLD AUTO: 4.15 M/UL
SODIUM SERPL-SCNC: 141 MMOL/L
SP GR UR STRIP: 1.02 (ref 1–1.03)
URN SPEC COLLECT METH UR: NORMAL
UROBILINOGEN UR STRIP-ACNC: NEGATIVE EU/DL
WBC # BLD AUTO: 11.15 K/UL

## 2018-04-08 PROCEDURE — 85610 PROTHROMBIN TIME: CPT

## 2018-04-08 PROCEDURE — 99284 EMERGENCY DEPT VISIT MOD MDM: CPT | Mod: 25

## 2018-04-08 PROCEDURE — 81025 URINE PREGNANCY TEST: CPT | Performed by: EMERGENCY MEDICINE

## 2018-04-08 PROCEDURE — 80053 COMPREHEN METABOLIC PANEL: CPT

## 2018-04-08 PROCEDURE — 81003 URINALYSIS AUTO W/O SCOPE: CPT

## 2018-04-08 PROCEDURE — 85025 COMPLETE CBC W/AUTO DIFF WBC: CPT

## 2018-04-08 PROCEDURE — 86901 BLOOD TYPING SEROLOGIC RH(D): CPT

## 2018-04-08 RX ORDER — METRONIDAZOLE 500 MG/1
500 TABLET ORAL 3 TIMES DAILY
Qty: 21 TABLET | Refills: 0 | Status: SHIPPED | OUTPATIENT
Start: 2018-04-08 | End: 2018-04-15

## 2018-04-08 RX ORDER — CIPROFLOXACIN 500 MG/1
500 TABLET ORAL 2 TIMES DAILY
Qty: 14 TABLET | Refills: 0 | Status: SHIPPED | OUTPATIENT
Start: 2018-04-08 | End: 2018-04-15

## 2018-04-08 NOTE — ED PROVIDER NOTES
Encounter Date: 4/8/2018    SCRIBE #1 NOTE: I, Fransico Zhang, am scribing for, and in the presence of,  Dr. Mcelroy. I have scribed the entire note.       History     Chief Complaint   Patient presents with    Abdominal Pain     C/o LLQ abdominal pain and distention for about 2 weeks. Was diagnosed with a kidney infection about 2 weeks ago. Began having rectal bleeding last night. Had 3 episodes yesterday     Raya Kent is a 22 y.o. female who  has a past medical history of Back pain; Depression; and H/O psychiatric hospitalization.    The patient presents to the ED due to left-sided abdominal pain for the past 3 weeks. She reports rectal bleeding which began last night. The patient states that she has had 3 episodes of rectal bleeding today. She has also been experiencing bladder incontinence and frequency, nausea, and blood in her stool. The patient reports only noticing rectal bleeding during BM. Patient denies fever, vomiting, or diarrhea. The patient was diagnosed with a kidney infection 2 weeks ago, and has not taken antibiotics since her diagnosis due to the cost of the medication. Patient reports that she consumes EtOH and is a smoker.      The history is provided by the patient.     Review of patient's allergies indicates:   Allergen Reactions    Sulfa (sulfonamide antibiotics)     Sulfa (sulfonamide antibiotics) Hives     Past Medical History:   Diagnosis Date    Back pain     Depression     H/O psychiatric hospitalization      Past Surgical History:   Procedure Laterality Date    INNER EAR SURGERY      TYMPANOPLASTY       Family History   Problem Relation Age of Onset    Hypertension Father     Diabetes Father     Hypertension Mother     Diabetes Mother      Social History   Substance Use Topics    Smoking status: Current Every Day Smoker     Packs/day: 2.00     Types: Cigarettes    Smokeless tobacco: Never Used    Alcohol use No     Review of Systems   Gastrointestinal: Positive for abdominal  pain and anal bleeding.   Genitourinary: Positive for flank pain and frequency.        Incontinence   All other systems reviewed and are negative.      Physical Exam     Initial Vitals [04/08/18 1209]   BP Pulse Resp Temp SpO2   120/87 66 18 98.2 °F (36.8 °C) --      MAP       98         Physical Exam    Nursing note and vitals reviewed.  Constitutional: She appears well-developed and well-nourished. No distress.   HENT:   Head: Normocephalic and atraumatic.   Eyes: Conjunctivae are normal.   Neck: Normal range of motion.   Cardiovascular: Normal rate, regular rhythm and normal heart sounds.   No murmur heard.  Pulmonary/Chest: Breath sounds normal. No respiratory distress.   Abdominal: Bowel sounds are normal. She exhibits no distension. There is tenderness.   Left CVA TTP, left-sided abdominal TTP   Musculoskeletal: Normal range of motion.   Neurological: She is alert and oriented to person, place, and time.   Skin: Skin is warm and dry.   Psychiatric: She has a normal mood and affect. Her behavior is normal.         ED Course   Procedures  Labs Reviewed   CBC W/ AUTO DIFFERENTIAL - Abnormal; Notable for the following:        Result Value     (*)     MCH 31.8 (*)     Mono% 3.9 (*)     All other components within normal limits   COMPREHENSIVE METABOLIC PANEL   URINALYSIS   PROTIME-INR   POCT URINE PREGNANCY   TYPE & SCREEN        Imaging Results          CT Renal Stone Study ABD Pelvis WO (Final result)  Result time 04/08/18 14:53:12    Final result by Anthony Obrien MD (04/08/18 14:53:12)                 Impression:      1. Mild mesenteric edema, noting mildly prominent proximal small bowel loops, findings suggest early changes of enteritis, clinical correlation is recommended.  Although there is some induration of the pericecal fat, this does not appear to directly arise from the appendix, and the appendix is otherwise unremarkable in appearance.  2. Nonspecific fluid in the pelvis, PID felt less  likely given relative normal appearance of the adnexa, correlation recommended.  3. Additional findings above..      Electronically signed by: Anthony Obrien MD  Date:    04/08/2018  Time:    14:53             Narrative:    EXAMINATION:  CT RENAL STONE STUDY ABD PELVIS WO    CLINICAL HISTORY:  Abdominal pain, unspecified;and left flank pain;    TECHNIQUE:  Low dose axial images, sagittal and coronal reformations were obtained from the lung bases to the pubic symphysis.  Contrast was not administered.    COMPARISON:  None    FINDINGS:  Images of the lower thorax are unremarkable.    The liver, spleen, pancreas, gallbladder and adrenal glands have a grossly unremarkable noncontrast appearance.  There is no biliary dilation.  Scattered shotty periaortic and paracaval lymph nodes are noted.    The kidneys have a grossly unremarkable noncontrast appearance, save for a subcentimeter high attenuating lesion arising from the interpolar region of the left kidney, too small for characterization.  The bilateral ureters are unremarkable without convincing calculi seen noting the ureters cannot be followed in their entirety to the urinary bladder.  The urinary bladder is grossly unremarkable.  The uterus and adnexa is grossly unremarkable noting nonspecific small amount of free fluid in the deep pelvis without convincing focal organized collection.    There is moderate stool in the colon.  The appendix is grossly unremarkable.  There is mesenteric edema.  There are a few proximal fluid-filled small bowel loops, mildly prominent, changes of early enteritis are a consideration, given mesenteric inflammation.  Clinical correlation is recommended.  Although this inflammation does extend to the region of the cecum, there does not appear to be discrete inflammation of the appendix itself.  The remainder of the small bowel is grossly unremarkable.    No focal osseous destructive process.  There is pars defect on the right at L5.  No  significant inguinal lymphadenopathy.                                   Medical Decision Making:   Clinical Tests:   Lab Tests: Ordered and Reviewed  Radiological Study: Ordered and Reviewed  ED Management:  22F with lower abd pain, rectal bleeding, UTI symptoms.  Work up shows enteritis - treat with cipro/flagyl.                      Clinical Impression:     1. Enteritis    2. Pain of upper abdomen            I, Dr. Yuliet Mcelroy, personally performed the services described in this documentation.   All medical record entries made by the scribe were at my direction and in my presence.   I have reviewed the chart and agree that the record is accurate and complete.   Yuliet Mcelroy MD.  1:04 PM 05/02/2018                    Yuliet Mcelroy MD  05/02/18 5414

## 2018-04-08 NOTE — ED NOTES
Pt provided with gown and instructed to change into it and remove all metal. Pt verbalized understanding.

## 2018-04-08 NOTE — DISCHARGE INSTRUCTIONS
Take all of the prescribed antibiotics.  If you can not fill the antibiotics, consider following up at [facility] downw for financial assistance.  You should follow up with [facility] or [facility] for evaluation after the antibiotics.

## 2018-05-28 ENCOUNTER — HOSPITAL ENCOUNTER (EMERGENCY)
Facility: HOSPITAL | Age: 23
Discharge: HOME | End: 2018-05-29
Attending: EMERGENCY MEDICINE

## 2018-05-28 DIAGNOSIS — N93.8 DUB (DYSFUNCTIONAL UTERINE BLEEDING): ICD-10-CM

## 2018-05-28 DIAGNOSIS — N92.4 EXCESSIVE BLEEDING IN PREMENOPAUSAL PERIOD: Primary | ICD-10-CM

## 2018-05-28 PROCEDURE — 99284 EMERGENCY DEPT VISIT MOD MDM: CPT | Mod: 25

## 2018-05-28 PROCEDURE — 81025 URINE PREGNANCY TEST: CPT | Performed by: EMERGENCY MEDICINE

## 2018-05-28 PROCEDURE — 99283 EMERGENCY DEPT VISIT LOW MDM: CPT | Mod: ,,, | Performed by: EMERGENCY MEDICINE

## 2018-05-29 VITALS
WEIGHT: 119.5 LBS | DIASTOLIC BLOOD PRESSURE: 57 MMHG | BODY MASS INDEX: 21.17 KG/M2 | OXYGEN SATURATION: 99 % | HEIGHT: 63 IN | HEART RATE: 75 BPM | RESPIRATION RATE: 18 BRPM | SYSTOLIC BLOOD PRESSURE: 117 MMHG | TEMPERATURE: 98 F

## 2018-05-29 LAB
B-HCG UR QL: NEGATIVE
BACTERIA GENITAL QL WET PREP: ABNORMAL
C TRACH DNA SPEC QL NAA+PROBE: NOT DETECTED
CLUE CELLS VAG QL WET PREP: ABNORMAL
CTP QC/QA: YES
FILAMENT FUNGI VAG WET PREP-#/AREA: ABNORMAL
N GONORRHOEA DNA SPEC QL NAA+PROBE: NOT DETECTED
SPECIMEN SOURCE: ABNORMAL
T VAGINALIS GENITAL QL WET PREP: ABNORMAL
WBC #/AREA VAG WET PREP: ABNORMAL
YEAST GENITAL QL WET PREP: ABNORMAL

## 2018-05-29 PROCEDURE — 87210 SMEAR WET MOUNT SALINE/INK: CPT

## 2018-05-29 PROCEDURE — 87491 CHLMYD TRACH DNA AMP PROBE: CPT

## 2018-05-29 RX ORDER — METOCLOPRAMIDE HYDROCHLORIDE 5 MG/ML
10 INJECTION INTRAMUSCULAR; INTRAVENOUS
Status: DISCONTINUED | OUTPATIENT
Start: 2018-05-29 | End: 2018-05-29 | Stop reason: HOSPADM

## 2018-05-29 NOTE — ED TRIAGE NOTES
Irregular vaginal bleeding and lower abdominal and back cramping for past 2 weeks. Says she has never bled irregularly like this before, on no contraception. Also reports fatigue.

## 2018-05-29 NOTE — ED NOTES
HEENT: WDL  Cardiac: WDL, apical pulse regular. Denies chest pain.   Respiratory: Respirations even and unlabored, denies SOB.   Skin: Warm, dry and intact. No discoloration noted.   Neuro: AAOx4, no abnormalities noted.   Musculoskeletal: No mobility deficits, active ROM in all extremities, denies pain.   GI: Abdomen soft and nondistended, denies GI symptoms.   : Voids spontaneously without difficulty. Irregular vaginal bleeding and cramping x 2 weeks.   Psychosocial: No abnormalities noted in behavior, speech, thought process and appearance.

## 2018-08-06 NOTE — PLAN OF CARE
0700 Pt arrives on unit with sig other  25.2 weeks, complains of abdominal pain since 0300, woke up with pain in her upper abdomen that moved to lower abdomen, constant aching with minor cramping. Rates 8/10. Full assessment done, plan of care reviewed with pt and pt sig other.     0720 Dr Causey updated on pt arrival, complaint, vitals, and fetal heart rate tracing, no ctx. Orders to send u/a and pt may have shot of toradol 30 mg. Pt updated on plan of care, desires shot, given PO hydration, pt unable to void at this time.     0810 U/a collected in sent to lab    0855 pt resting comfortably, denies pain at this time. Awaiting u/a results.     0930 Dr Causey reviewed u/a results, Pt d/c to home on labor precautions and comfort measures. Pt can recall all information. Pt leaves with sig other.   
69598 Exp Problem Focused - Mod. Complex

## 2019-02-07 NOTE — DISCHARGE INSTRUCTIONS
Abdominal Pain and Early Pregnancy    (To rule out ectopic pregnancy or miscarriage)  Our tests show that you are pregnant, but the exact cause of your pain isnt clear.  Some pain and bleeding are common early in pregnancy. Often they stop, and you can go on to have a normal pregnancy and baby. Other times the pain or bleeding can be signs of a miscarriage or ectopic pregnancy. An ectopic pregnancy is a very serious problem. At this time it is unclear if your pregnancy will continue normally, if you will have a miscarriage, or if you could have an ectopic pregnancy. Below is some information about this.  Miscarriage  At this time we dont know whether you will have a miscarriage, or if things will clear up and your pregnancy will continue normally. We understand that this is emotionally difficult. There is little we can say to change the way you feel. But understand that miscarriages are common.  About 1 or 2 out of every 10 pregnancies end this way. Some end even before you know you are pregnant. This happens for a number of reasons, and usually we never figure out why. Its important you know that it is not your fault. It didnt happen because you did anything wrong.  Having sex or exercising does not cause a miscarriage. These activities are usually safe unless you have pain or bleeding or your doctor tells you to stop. Even minor falls wont cause a miscarriage. Miscarriages happen because things were not developing as they were supposed to. No medicine can prevent a miscarriage.  Ectopic pregnancy  In a normal pregnancy, the fertilized egg attaches to the wall of the womb (uterus). In an ectopic or tubal pregnancy, the fertilized egg attaches outside the uterus, usually in the fallopian tube. Very rarely, the egg attaches to an ovary or somewhere else in the abdomen. An ectopic pregnancy is much less common than a miscarriage, but it is very serious. The baby cannot survive, and as it grows it can rupture  the tube. This can cause internal bleeding and even death. Risk factors for an ectopic are:  · An ectopic pregnancy in the past  · Pelvic inflammatory disease, or PID  · Endometriosis  · Smoking  · An IUD  Additional tests  Because we dont know whats causing your symptoms, you will need more tests to figure out what the problem is. You may need:  Ultrasound  An ultrasound can usually find a normal pregnancy as early as 4 to 5 weeks along. If the ultrasound does not show the baby inside the uterus, it means that:  · You have a normal pregnancy less than 4 weeks along, or  · You are having or recently had a miscarriage, or  · You have an ectopic pregnancy  Quantitative HCG  This test measures the amount of a pregnancy hormone in your blood. Comparing today's test result to a repeat test in 2 days will show whether you have a normal pregnancy.  Laparoscopy  This is a type of surgery. The healthcare provider will put a tube with a light inside your belly (abdomen) to look directly at your pelvic organs. This test is used when it is not safe to wait 2 days for blood test results.  Important information  If you do have an ectopic pregnancy, there is a small chance that the growing fetus can tear the fallopian tube. This can cause severe internal bleeding. If this happens, you may have:  · Sudden severe pain in your lower abdomen  · Vaginal bleeding  · Weakness, dizziness, and sometimes fainting  If any of these symptoms occur:  · Call 911 or return immediately to the hospital.  · Do not drive yourself.  · Do not go to your healthcare provider's office or to a clinic. Go to the hospital.   Home care  Follow these guidelines to help care for yourself at home:  · Rest until your next exam. Dont do anything strenuous.  · Eat a light diet with foods that are easy to digest.  · Dont have sex until your healthcare provider says its OK.  Follow-up care  Follow up with your healthcare provider, or as advised. If you were told  to have a repeat blood test in 2 days, its important to get it done.  If you had an X-ray or ultrasound, a radiologist will review it. You will be told of any new findings that may affect your care.  Call 911  Call 911 if you have any of these:  · Severe pain and very heavy bleeding  · Severe lightheadedness, passing out, or fainting  · Rapid heart rate  · Trouble breathing  · Confused or difficulty waking up  When to seek medical advice  Call your healthcare provider right away if any of these occur:  · The pain in your abdomen gets worse, either suddenly or gradually.  · You are dizzy or weak when you stand.  · You have heavy vaginal bleeding. This means soaking 1 pad an hour for 3 hours.  · You have vaginal bleeding for more than 5 days.  · You have repeated vomiting or diarrhea.  · The pain in your abdomen moves to the lower right.  · You have blood in your vomit or bowel movements. This will be dark red or black.  · You have a fever of 100.4ºF (38ºC) or higher, or as directed by your healthcare provider  Date Last Reviewed: 10/1/2016  © 1711-0054 [employer]. [address] [place] [address], [place] [address]. All rights reserved. This information is not intended as a substitute for professional medical care. Always follow your healthcare professional's instructions.         2011

## 2019-02-14 ENCOUNTER — HOSPITAL ENCOUNTER (EMERGENCY)
Facility: HOSPITAL | Age: 24
Discharge: HOME | End: 2019-02-14
Attending: EMERGENCY MEDICINE
Payer: MEDICAID

## 2019-02-14 VITALS
BODY MASS INDEX: 18.78 KG/M2 | TEMPERATURE: 98 F | HEIGHT: 64 IN | RESPIRATION RATE: 16 BRPM | OXYGEN SATURATION: 100 % | DIASTOLIC BLOOD PRESSURE: 65 MMHG | HEART RATE: 112 BPM | SYSTOLIC BLOOD PRESSURE: 106 MMHG | WEIGHT: 110 LBS

## 2019-02-14 DIAGNOSIS — S93.409A SPRAIN OF ANKLE, UNSPECIFIED LATERALITY, UNSPECIFIED LIGAMENT, INITIAL ENCOUNTER: Primary | ICD-10-CM
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
## 2019-02-14 LAB
B-HCG UR QL: NEGATIVE
CTP QC/QA: YES

## 2019-02-14 PROCEDURE — 25000003 PHARM REV CODE 250: Performed by: EMERGENCY MEDICINE

## 2019-02-14 PROCEDURE — 99284 EMERGENCY DEPT VISIT MOD MDM: CPT | Mod: 25

## 2019-02-14 PROCEDURE — 81025 URINE PREGNANCY TEST: CPT | Performed by: EMERGENCY MEDICINE

## 2019-02-14 RX ORDER — IBUPROFEN 600 MG/1
600 TABLET ORAL
Status: COMPLETED | OUTPATIENT
Start: 2019-02-14 | End: 2019-02-14

## 2019-02-14 RX ORDER — IBUPROFEN 600 MG/1
600 TABLET ORAL EVERY 6 HOURS PRN
Qty: 20 TABLET | Refills: 0 | Status: SHIPPED | OUTPATIENT
Start: 2019-02-14

## 2019-02-14 RX ADMIN — IBUPROFEN 600 MG: 600 TABLET ORAL at 10:02

## 2019-02-15 NOTE — ED PROVIDER NOTES
Encounter Date: 2/14/2019    SCRIBE #1 NOTE: I, Fransico Zhang, am scribing for, and in the presence of,  Dr. Lefort. I have scribed the entire note.       History     Chief Complaint   Patient presents with    Foot Injury     23y F ambulatory with assistance to ED with c/o pain to left ankle. pt was jumping out the back of a truck and twisted ankle     Time seen by provider: 9:31 PM    This is a 23 y.o. female who presents with complaint of left ankle pain s/p injury tonight. Patient reports that she twisted her ankle while jumping out the back of a truck, and is having pain from her left ankle to the entire left foot. Patient denies any further trauma or numbness/tingling.      The history is provided by the patient.     Review of patient's allergies indicates:   Allergen Reactions    Sulfa (sulfonamide antibiotics)     Sulfa (sulfonamide antibiotics) Hives     Past Medical History:   Diagnosis Date    Back pain     Depression     H/O psychiatric hospitalization      Past Surgical History:   Procedure Laterality Date    INNER EAR SURGERY      TYMPANOPLASTY       Family History   Problem Relation Age of Onset    Hypertension Father     Diabetes Father     Hypertension Mother     Diabetes Mother      Social History     Tobacco Use    Smoking status: Current Every Day Smoker     Packs/day: 2.00     Types: Cigarettes    Smokeless tobacco: Never Used   Substance Use Topics    Alcohol use: Yes     Comment: Occasionally     Drug use: No     Review of Systems   Musculoskeletal: Positive for arthralgias.   Neurological: Negative for numbness.     Physical Exam     Initial Vitals [02/14/19 2112]   BP Pulse Resp Temp SpO2   106/65 (!) 112 16 97.8 °F (36.6 °C) 100 %      MAP       --         Physical Exam    Nursing note and vitals reviewed.  Cardiovascular: Intact distal pulses.   Pulmonary/Chest: No respiratory distress.   Musculoskeletal: Normal range of motion. She exhibits no edema or tenderness.    Neurological: She is alert. She has normal strength.   Skin:   Scattered excoriations without secondary infection       ED Course   Procedures  Labs Reviewed   POCT URINE PREGNANCY          X-Rays:   Independently Interpreted Readings:   Other Readings:  Reviewed by myself, read by radiology.    Imaging Results          X-Ray Ankle Complete Left (Final result)  Result time 02/14/19 22:59:33    Final result by Brad Ann MD (02/14/19 22:59:33)                 Impression:      No acute osseous abnormality identified.      Electronically signed by: Brad Ann MD  Date:    02/14/2019  Time:    22:59             Narrative:    EXAMINATION:  XR FOOT COMPLETE 3 VIEW LEFT; XR ANKLE COMPLETE 3 VIEW LEFT    CLINICAL HISTORY:  .  Unspecified fall, initial encounter    TECHNIQUE:  AP, lateral and oblique views of the left foot were performed.  Right ankle three views.    COMPARISON:  None    FINDINGS:  No evidence of acute fracture, dislocation, or osseous destructive process.  There is mild hallux valgus.  Lisfranc articulation appears congruent.  Ankle mortise is maintained.  Small os trigonum is noted.                               X-Ray Foot Complete Left (Final result)  Result time 02/14/19 22:59:33    Final result by Brad Ann MD (02/14/19 22:59:33)                 Impression:      No acute osseous abnormality identified.      Electronically signed by: Brad Ann MD  Date:    02/14/2019  Time:    22:59             Narrative:    EXAMINATION:  XR FOOT COMPLETE 3 VIEW LEFT; XR ANKLE COMPLETE 3 VIEW LEFT    CLINICAL HISTORY:  .  Unspecified fall, initial encounter    TECHNIQUE:  AP, lateral and oblique views of the left foot were performed.  Right ankle three views.    COMPARISON:  None    FINDINGS:  No evidence of acute fracture, dislocation, or osseous destructive process.  There is mild hallux valgus.  Lisfranc articulation appears congruent.  Ankle mortise is maintained.  Small os trigonum is  noted.                              Medical Decision Making:   Differential Diagnosis:   Fx, sprain, gout, septic arth  Clinical Tests:   Lab Tests: Ordered and Reviewed  Radiological Study: Ordered and Reviewed  ED Management:  Unremarkable exam, imaging for traumatic findings.  RICE, Airsplint, crutches, NSAids, discussed follow up and return precautions.                      Clinical Impression:     1. Sprain of ankle, unspecified laterality, unspecified ligament, initial encounter    2. Fall      Disposition:   Disposition: Discharged  Condition: Stable       Scribe attestation I, Dr. Guy Lefort, personally performed the services described in this documentation. All medical record entries made by the scribe were at my direction and in my presence. I have reviewed the chart and agree that the record reflects my personal performance and is accurate and complete. Guy Lefort, MD.  5:07 AM 02/18/2019       Guy J. Lefort, MD  02/18/19 [id number]